# Patient Record
Sex: MALE | Race: WHITE | NOT HISPANIC OR LATINO | Employment: PART TIME | URBAN - METROPOLITAN AREA
[De-identification: names, ages, dates, MRNs, and addresses within clinical notes are randomized per-mention and may not be internally consistent; named-entity substitution may affect disease eponyms.]

---
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
## 2022-01-31 ENCOUNTER — OFFICE VISIT (OUTPATIENT)
Dept: PEDIATRICS | Facility: CLINIC | Age: 22
End: 2022-01-31
Payer: COMMERCIAL

## 2022-01-31 VITALS
BODY MASS INDEX: 33 KG/M2 | WEIGHT: 206 LBS | HEART RATE: 76 BPM | TEMPERATURE: 98 F | DIASTOLIC BLOOD PRESSURE: 79 MMHG | SYSTOLIC BLOOD PRESSURE: 126 MMHG

## 2022-01-31 DIAGNOSIS — F90.2 ADHD (ATTENTION DEFICIT HYPERACTIVITY DISORDER), COMBINED TYPE: Primary | ICD-10-CM

## 2022-01-31 DIAGNOSIS — F41.9 ANXIETY: ICD-10-CM

## 2022-01-31 PROCEDURE — 1160F RVW MEDS BY RX/DR IN RCRD: CPT | Mod: CPTII,S$GLB,, | Performed by: PEDIATRICS

## 2022-01-31 PROCEDURE — 3074F PR MOST RECENT SYSTOLIC BLOOD PRESSURE < 130 MM HG: ICD-10-PCS | Mod: CPTII,S$GLB,, | Performed by: PEDIATRICS

## 2022-01-31 PROCEDURE — 1159F MED LIST DOCD IN RCRD: CPT | Mod: CPTII,S$GLB,, | Performed by: PEDIATRICS

## 2022-01-31 PROCEDURE — 3078F PR MOST RECENT DIASTOLIC BLOOD PRESSURE < 80 MM HG: ICD-10-PCS | Mod: CPTII,S$GLB,, | Performed by: PEDIATRICS

## 2022-01-31 PROCEDURE — 99214 OFFICE O/P EST MOD 30 MIN: CPT | Mod: S$GLB,,, | Performed by: PEDIATRICS

## 2022-01-31 PROCEDURE — 3008F PR BODY MASS INDEX (BMI) DOCUMENTED: ICD-10-PCS | Mod: CPTII,S$GLB,, | Performed by: PEDIATRICS

## 2022-01-31 PROCEDURE — 1160F PR REVIEW ALL MEDS BY PRESCRIBER/CLIN PHARMACIST DOCUMENTED: ICD-10-PCS | Mod: CPTII,S$GLB,, | Performed by: PEDIATRICS

## 2022-01-31 PROCEDURE — 99214 PR OFFICE/OUTPT VISIT, EST, LEVL IV, 30-39 MIN: ICD-10-PCS | Mod: S$GLB,,, | Performed by: PEDIATRICS

## 2022-01-31 PROCEDURE — 3008F BODY MASS INDEX DOCD: CPT | Mod: CPTII,S$GLB,, | Performed by: PEDIATRICS

## 2022-01-31 PROCEDURE — 3074F SYST BP LT 130 MM HG: CPT | Mod: CPTII,S$GLB,, | Performed by: PEDIATRICS

## 2022-01-31 PROCEDURE — 1159F PR MEDICATION LIST DOCUMENTED IN MEDICAL RECORD: ICD-10-PCS | Mod: CPTII,S$GLB,, | Performed by: PEDIATRICS

## 2022-01-31 PROCEDURE — 99999 PR PBB SHADOW E&M-EST. PATIENT-LVL IV: CPT | Mod: PBBFAC,,, | Performed by: PEDIATRICS

## 2022-01-31 PROCEDURE — 3078F DIAST BP <80 MM HG: CPT | Mod: CPTII,S$GLB,, | Performed by: PEDIATRICS

## 2022-01-31 PROCEDURE — 99999 PR PBB SHADOW E&M-EST. PATIENT-LVL IV: ICD-10-PCS | Mod: PBBFAC,,, | Performed by: PEDIATRICS

## 2022-01-31 RX ORDER — FLUOXETINE HYDROCHLORIDE 40 MG/1
40 CAPSULE ORAL DAILY
Qty: 90 CAPSULE | Refills: 0 | Status: SHIPPED | OUTPATIENT
Start: 2022-01-31 | End: 2022-05-01

## 2022-01-31 RX ORDER — DEXMETHYLPHENIDATE HYDROCHLORIDE 40 MG/1
40 CAPSULE, EXTENDED RELEASE ORAL EVERY MORNING
Qty: 30 CAPSULE | Refills: 0 | Status: SHIPPED | OUTPATIENT
Start: 2022-01-31 | End: 2022-05-11 | Stop reason: SDUPTHER

## 2022-01-31 NOTE — PROGRESS NOTES
Subjective:   HPI:  Agustin Minor is a 21 y.o. child here for follow up ADHD visit, accompanied by self      his  ADHD symtoms have increased since last visit. Med wears off early in four to five hours      Attentive with homework or afternoon: yes    Side effects of Medicine:    Trouble Sleeping-no    Appetite Changes- decreased   Jitteriness- no  Irritability or moodiness- no  Headaches or Stomach Aches- no     Level/Grade in School:[employer] yr, chemical eng & bio chem, minor in arabic (aceing it)  Performance: GPA- 3.0  Now 15 hours, was 21 hours.    Current ADHD Medication/Dose in am: Focalin XR 30mg    Afternoon medicine?   Dose- none    Last RHS:  8/6/21    Review of patient's allergies indicates:   Allergen Reactions    Sulfa (sulfonamide antibiotics)        Patient Active Problem List   Diagnosis    Vasovagal syncope    ADHD (attention deficit hyperactivity disorder), combined type    Anxiety       Review of Systems     No flowsheet data found.      Objective:   PHYSICAL EXAM  Vitals:    01/31/22 1037   BP: 126/79   Pulse: 76   Temp: 97.8 °F (36.6 °C)   Weight: 93.4 kg (206 lb)       Weight change since last visit- [unfilled]   Last 3 Weights:   Wt Readings from Last 3 Encounters:   01/31/22 1037 93.4 kg (206 lb) (94 %, Z= 1.55)*   11/10/21 1107 96.6 kg (213 lb) (96 %, Z= 1.73)*   06/30/21 0820 95.3 kg (210 lb) (95 %, Z= 1.69)*     * Growth percentiles are based on CDC (Boys, 2-20 Years) data.           General:  Patient is alert and calm/cooperative. No agitation was noted.  ENT: Both tympanic membranes appeared normal. Nasopharynx was clear. Oropharynx had no lesions or redness in the throat. No dental caries were present.  Eyes: No eye twitching or tics. No injection or eye discharge. Pupils were equal, round, and reactive.  Resp: No wheezing or crackles.  Good air flow throughout.  Cardiovascular: Regular rate and rhythm -- no murmurs present.  Abd/GI: No tenderness, hepatomegaly,  splenomegaly, or masses.  Neuro: No tics or tremors noted.  Behavior/Psych: No signs of irritability or confusion.      Assessment/Plan:        ADHD (attention deficit hyperactivity disorder), combined type  -     dexmethylphenidate (FOCALIN XR) 40 mg 24 hr capsule; Take 40 mg by mouth every morning.  Dispense: 30 capsule; Refill: 0    Anxiety  -     FLUoxetine 40 MG capsule; Take 1 capsule (40 mg total) by mouth once daily.  Dispense: 90 capsule; Refill: 0            Outpatient Encounter Medications as of 1/31/2022   Medication Sig Dispense Refill    dexmethylphenidate (FOCALIN XR) 30 mg 24 hr capsule Take 1 capsule by mouth once daily. 30 capsule 0    levocetirizine (XYZAL) 5 MG tablet Take 5 mg by mouth.      mometasone (ASMANEX TWISTHALER) 220 mcg/ actuation (120) AePB Inhale into the lungs. Controller      albuterol (PROVENTIL/VENTOLIN HFA) 90 mcg/actuation inhaler       ALPRAZolam (XANAX) 0.5 MG tablet Take 1 tablet (0.5 mg total) by mouth as needed for Anxiety. Take 0.25 one hour before appointment 3 tablet 0    dexmethylphenidate (FOCALIN XR) 40 mg 24 hr capsule Take 40 mg by mouth every morning. 30 capsule 0    dextroamphetamine-amphetamine (ADDERALL XR) 30 MG 24 hr capsule Take 1 capsule (30 mg total) by mouth every morning. (Patient not taking: Reported on 1/31/2022) 30 capsule 0    FLUoxetine 40 MG capsule Take 40 mg by mouth once daily.      FLUoxetine 40 MG capsule Take 1 capsule (40 mg total) by mouth once daily. (Patient not taking: Reported on 1/31/2022) 90 capsule 0    FLUoxetine 40 MG capsule Take 1 capsule (40 mg total) by mouth once daily. 90 capsule 0    hydrocortisone 2.5 % ointment APPLY EXTERNALLY TO THE AFFECTED AREA ON NECK TWICE DAILY AS NEEDED      SYMBICORT 160-4.5 mcg/actuation HFAA       triamcinolone acetonide 0.1% (KENALOG) 0.1 % paste APPLY TO ULCER THREE TIMES DAILY AS NEEDED       No facility-administered encounter medications on file as of 1/31/2022.         Next  scheduled follow-up appointment for ADD/ADHD management will be in 3 months.  If changes are made to medications, then will need to follow up in three to four weeks.    We discussed the management goals for patients with ADHD:  1. Adequate Control of Focus and/or Behavior.  2. Tolerable Medication Side-Effects (Including some Loss of Appetite).  Need to maintain weight.  3. Function well in life, school and/or work.

## 2022-03-01 ENCOUNTER — OFFICE VISIT (OUTPATIENT)
Dept: PEDIATRICS | Facility: CLINIC | Age: 22
End: 2022-03-01
Payer: COMMERCIAL

## 2022-03-01 VITALS
DIASTOLIC BLOOD PRESSURE: 80 MMHG | HEART RATE: 67 BPM | BODY MASS INDEX: 33.48 KG/M2 | TEMPERATURE: 98 F | SYSTOLIC BLOOD PRESSURE: 133 MMHG | WEIGHT: 209 LBS

## 2022-03-01 DIAGNOSIS — F90.2 ADHD (ATTENTION DEFICIT HYPERACTIVITY DISORDER), COMBINED TYPE: Primary | ICD-10-CM

## 2022-03-01 DIAGNOSIS — F41.9 ANXIETY: ICD-10-CM

## 2022-03-01 PROCEDURE — 3079F PR MOST RECENT DIASTOLIC BLOOD PRESSURE 80-89 MM HG: ICD-10-PCS | Mod: CPTII,S$GLB,, | Performed by: PEDIATRICS

## 2022-03-01 PROCEDURE — 1159F PR MEDICATION LIST DOCUMENTED IN MEDICAL RECORD: ICD-10-PCS | Mod: CPTII,S$GLB,, | Performed by: PEDIATRICS

## 2022-03-01 PROCEDURE — 3075F SYST BP GE 130 - 139MM HG: CPT | Mod: CPTII,S$GLB,, | Performed by: PEDIATRICS

## 2022-03-01 PROCEDURE — 99214 OFFICE O/P EST MOD 30 MIN: CPT | Mod: S$GLB,,, | Performed by: PEDIATRICS

## 2022-03-01 PROCEDURE — 99214 PR OFFICE/OUTPT VISIT, EST, LEVL IV, 30-39 MIN: ICD-10-PCS | Mod: S$GLB,,, | Performed by: PEDIATRICS

## 2022-03-01 PROCEDURE — 99999 PR PBB SHADOW E&M-EST. PATIENT-LVL III: ICD-10-PCS | Mod: PBBFAC,,, | Performed by: PEDIATRICS

## 2022-03-01 PROCEDURE — 1160F PR REVIEW ALL MEDS BY PRESCRIBER/CLIN PHARMACIST DOCUMENTED: ICD-10-PCS | Mod: CPTII,S$GLB,, | Performed by: PEDIATRICS

## 2022-03-01 PROCEDURE — 1159F MED LIST DOCD IN RCRD: CPT | Mod: CPTII,S$GLB,, | Performed by: PEDIATRICS

## 2022-03-01 PROCEDURE — 99999 PR PBB SHADOW E&M-EST. PATIENT-LVL III: CPT | Mod: PBBFAC,,, | Performed by: PEDIATRICS

## 2022-03-01 PROCEDURE — 3008F BODY MASS INDEX DOCD: CPT | Mod: CPTII,S$GLB,, | Performed by: PEDIATRICS

## 2022-03-01 PROCEDURE — 3079F DIAST BP 80-89 MM HG: CPT | Mod: CPTII,S$GLB,, | Performed by: PEDIATRICS

## 2022-03-01 PROCEDURE — 1160F RVW MEDS BY RX/DR IN RCRD: CPT | Mod: CPTII,S$GLB,, | Performed by: PEDIATRICS

## 2022-03-01 PROCEDURE — 3008F PR BODY MASS INDEX (BMI) DOCUMENTED: ICD-10-PCS | Mod: CPTII,S$GLB,, | Performed by: PEDIATRICS

## 2022-03-01 PROCEDURE — 3075F PR MOST RECENT SYSTOLIC BLOOD PRESS GE 130-139MM HG: ICD-10-PCS | Mod: CPTII,S$GLB,, | Performed by: PEDIATRICS

## 2022-03-01 RX ORDER — DEXMETHYLPHENIDATE HYDROCHLORIDE 40 MG/1
40 CAPSULE, EXTENDED RELEASE ORAL EVERY MORNING
Qty: 30 CAPSULE | Refills: 0 | Status: SHIPPED | OUTPATIENT
Start: 2022-03-31 | End: 2022-04-30

## 2022-03-01 RX ORDER — DEXMETHYLPHENIDATE HYDROCHLORIDE 40 MG/1
40 CAPSULE, EXTENDED RELEASE ORAL EVERY MORNING
Qty: 30 CAPSULE | Refills: 0 | Status: SHIPPED | OUTPATIENT
Start: 2022-04-30 | End: 2022-05-30

## 2022-03-01 RX ORDER — FLUOXETINE HYDROCHLORIDE 40 MG/1
40 CAPSULE ORAL DAILY
Qty: 90 CAPSULE | Refills: 0 | Status: SHIPPED | OUTPATIENT
Start: 2022-03-01 | End: 2022-05-30

## 2022-03-01 RX ORDER — DEXMETHYLPHENIDATE HYDROCHLORIDE 40 MG/1
40 CAPSULE, EXTENDED RELEASE ORAL EVERY MORNING
Qty: 30 CAPSULE | Refills: 0 | Status: SHIPPED | OUTPATIENT
Start: 2022-03-01 | End: 2022-03-31

## 2022-03-01 NOTE — PROGRESS NOTES
Subjective:   HPI:  Agustin Minor is a 21 y.o. child here for follow up ADHD visit,  alone, who is a historian    his  ADHD symtoms have no change since last visit.    Attentive with homework or afternoon: yes    Side effects of Medicine:    Trouble Sleeping-no    Appetite Changes- no   Jitteriness- no  Irritability or moodiness- no  Headaches or Stomach Aches- no     Level/Grade in School:[employer]/ sophomore yr, chemical eng. , bio chem, minor in [place]  Performance: GPA 3.0    Current ADHD Medication/Dose in am: Focalin XR 40mg   Afternoon medicine?   Dose- none    Anxiety- controlled  On Prozac 40 mg. Daily    Last RHS:  08/06/21    Review of patient's allergies indicates:   Allergen Reactions    Sulfa (sulfonamide antibiotics)        Patient Active Problem List   Diagnosis    Vasovagal syncope    ADHD (attention deficit hyperactivity disorder), combined type    Anxiety       Review of Systems     No flowsheet data found.      Objective:   PHYSICAL EXAM  Vitals:    03/01/22 1036   BP: 133/80   Pulse: 67   Temp: 98 °F (36.7 °C)   Weight: 94.8 kg (209 lb)       Weight change since last visit- [unfilled]   Last 3 Weights:   Wt Readings from Last 3 Encounters:   03/01/22 1036 94.8 kg (209 lb) (95 %, Z= 1.61)*   01/31/22 1037 93.4 kg (206 lb) (94 %, Z= 1.55)*   11/10/21 1107 96.6 kg (213 lb) (96 %, Z= 1.73)*     * Growth percentiles are based on CDC (Boys, 2-20 Years) data.           General:  Patient is alert and calm/cooperative. No agitation was noted.  ENT: Both tympanic membranes appeared normal. Nasopharynx was clear. Oropharynx had no lesions or redness in the throat. No dental caries were present.  Eyes: No eye twitching or tics. No injection or eye discharge. Pupils were equal, round, and reactive.  Resp: No wheezing or crackles.  Good air flow throughout.  Cardiovascular: Regular rate and rhythm -- no murmurs present.  Abd/GI: No tenderness, hepatomegaly, splenomegaly, or masses.  Neuro: No tics  or tremors noted.  Behavior/Psych: No signs of irritability or confusion.      Assessment/Plan:        ADHD (attention deficit hyperactivity disorder), combined type  -     dexmethylphenidate (FOCALIN XR) 40 mg 24 hr capsule; Take 40 mg by mouth every morning.  Dispense: 30 capsule; Refill: 0  -     dexmethylphenidate (FOCALIN XR) 40 mg 24 hr capsule; Take 40 mg by mouth every morning.  Dispense: 30 capsule; Refill: 0  -     dexmethylphenidate (FOCALIN XR) 40 mg 24 hr capsule; Take 40 mg by mouth every morning.  Dispense: 30 capsule; Refill: 0    Anxiety  -     FLUoxetine 40 MG capsule; Take 1 capsule (40 mg total) by mouth once daily.  Dispense: 90 capsule; Refill: 0            Outpatient Encounter Medications as of 3/1/2022   Medication Sig Dispense Refill    albuterol (PROVENTIL/VENTOLIN HFA) 90 mcg/actuation inhaler       dexmethylphenidate (FOCALIN XR) 40 mg 24 hr capsule Take 40 mg by mouth every morning. 30 capsule 0    FLUoxetine 40 MG capsule Take 1 capsule (40 mg total) by mouth once daily. 90 capsule 0    ALPRAZolam (XANAX) 0.5 MG tablet Take 1 tablet (0.5 mg total) by mouth as needed for Anxiety. Take 0.25 one hour before appointment 3 tablet 0    dexmethylphenidate (FOCALIN XR) 30 mg 24 hr capsule Take 1 capsule by mouth once daily. (Patient not taking: Reported on 3/1/2022) 30 capsule 0    dexmethylphenidate (FOCALIN XR) 40 mg 24 hr capsule Take 40 mg by mouth every morning. 30 capsule 0    [START ON 3/31/2022] dexmethylphenidate (FOCALIN XR) 40 mg 24 hr capsule Take 40 mg by mouth every morning. 30 capsule 0    [START ON 4/30/2022] dexmethylphenidate (FOCALIN XR) 40 mg 24 hr capsule Take 40 mg by mouth every morning. 30 capsule 0    dextroamphetamine-amphetamine (ADDERALL XR) 30 MG 24 hr capsule Take 1 capsule (30 mg total) by mouth every morning. (Patient not taking: Reported on 1/31/2022) 30 capsule 0    FLUoxetine 40 MG capsule Take 40 mg by mouth once daily.      FLUoxetine 40 MG  capsule Take 1 capsule (40 mg total) by mouth once daily. 90 capsule 0    hydrocortisone 2.5 % ointment APPLY EXTERNALLY TO THE AFFECTED AREA ON NECK TWICE DAILY AS NEEDED      levocetirizine (XYZAL) 5 MG tablet Take 5 mg by mouth.      mometasone (ASMANEX TWISTHALER) 220 mcg/ actuation (120) AePB Inhale into the lungs. Controller      SYMBICORT 160-4.5 mcg/actuation HFAA       triamcinolone acetonide 0.1% (KENALOG) 0.1 % paste APPLY TO ULCER THREE TIMES DAILY AS NEEDED       No facility-administered encounter medications on file as of 3/1/2022.         Next scheduled follow-up appointment for ADD/ADHD management will be in 3 months.  If changes are made to medications, then will need to follow up in three to four weeks.    We discussed the management goals for patients with ADHD:  1. Adequate Control of Focus and/or Behavior.  2. Tolerable Medication Side-Effects (Including some Loss of Appetite).  Need to maintain weight.  3. Function well in life, school and/or work.

## 2022-03-29 ENCOUNTER — OFFICE VISIT (OUTPATIENT)
Dept: PEDIATRICS | Facility: CLINIC | Age: 22
End: 2022-03-29
Payer: COMMERCIAL

## 2022-03-29 VITALS — TEMPERATURE: 98 F | WEIGHT: 202 LBS | BODY MASS INDEX: 32.36 KG/M2

## 2022-03-29 DIAGNOSIS — K21.9 GASTROESOPHAGEAL REFLUX DISEASE, UNSPECIFIED WHETHER ESOPHAGITIS PRESENT: Primary | ICD-10-CM

## 2022-03-29 DIAGNOSIS — K52.9 GASTROENTERITIS: ICD-10-CM

## 2022-03-29 PROBLEM — J45.909 ASTHMA: Status: ACTIVE | Noted: 2022-03-29

## 2022-03-29 PROCEDURE — 99214 OFFICE O/P EST MOD 30 MIN: CPT | Mod: S$GLB,,, | Performed by: PEDIATRICS

## 2022-03-29 PROCEDURE — 1159F PR MEDICATION LIST DOCUMENTED IN MEDICAL RECORD: ICD-10-PCS | Mod: CPTII,S$GLB,, | Performed by: PEDIATRICS

## 2022-03-29 PROCEDURE — 99999 PR PBB SHADOW E&M-EST. PATIENT-LVL IV: CPT | Mod: PBBFAC,,, | Performed by: PEDIATRICS

## 2022-03-29 PROCEDURE — 99214 PR OFFICE/OUTPT VISIT, EST, LEVL IV, 30-39 MIN: ICD-10-PCS | Mod: S$GLB,,, | Performed by: PEDIATRICS

## 2022-03-29 PROCEDURE — 1159F MED LIST DOCD IN RCRD: CPT | Mod: CPTII,S$GLB,, | Performed by: PEDIATRICS

## 2022-03-29 PROCEDURE — 99999 PR PBB SHADOW E&M-EST. PATIENT-LVL IV: ICD-10-PCS | Mod: PBBFAC,,, | Performed by: PEDIATRICS

## 2022-03-29 PROCEDURE — 1160F PR REVIEW ALL MEDS BY PRESCRIBER/CLIN PHARMACIST DOCUMENTED: ICD-10-PCS | Mod: CPTII,S$GLB,, | Performed by: PEDIATRICS

## 2022-03-29 PROCEDURE — 3008F PR BODY MASS INDEX (BMI) DOCUMENTED: ICD-10-PCS | Mod: CPTII,S$GLB,, | Performed by: PEDIATRICS

## 2022-03-29 PROCEDURE — 1160F RVW MEDS BY RX/DR IN RCRD: CPT | Mod: CPTII,S$GLB,, | Performed by: PEDIATRICS

## 2022-03-29 PROCEDURE — 3008F BODY MASS INDEX DOCD: CPT | Mod: CPTII,S$GLB,, | Performed by: PEDIATRICS

## 2022-03-29 RX ORDER — ESOMEPRAZOLE MAGNESIUM 40 MG/1
40 CAPSULE, DELAYED RELEASE ORAL DAILY
Qty: 30 CAPSULE | Refills: 1 | Status: SHIPPED | OUTPATIENT
Start: 2022-03-29 | End: 2023-03-29

## 2022-03-29 NOTE — PROGRESS NOTES
SUBJECTIVE:  Agustin Minor is a 21 y.o. 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    HPI  X1 day, abdominal pain that started in lower abdomen and moved to mid abdomen, vomited few times, no fever/  Located above umbilicus    Agustin's allergies, medications, history, and problem list were updated as appropriate.    Review of Systems  A comprehensive review of symptoms was completed and negative except as noted in the HPI.    OBJECTIVE:  Vital signs  Vitals:    03/29/22 1434   Temp: 97.7 °F (36.5 °C)   Weight: 91.6 kg (202 lb)        Physical Exam  Vitals reviewed.   Constitutional:       Appearance: Normal appearance.   HENT:      Right Ear: Tympanic membrane normal.      Left Ear: Tympanic membrane normal.      Nose: Nose normal.      Mouth/Throat:      Pharynx: Oropharynx is clear.   Eyes:      Conjunctiva/sclera: Conjunctivae normal.   Cardiovascular:      Rate and Rhythm: Normal rate and regular rhythm.      Heart sounds: Normal heart sounds. No murmur heard.    No friction rub. No gallop.   Pulmonary:      Breath sounds: Normal breath sounds.   Abdominal:      General: Abdomen is flat. There is no distension.      Palpations: Abdomen is soft. There is no mass.      Tenderness: There is no abdominal tenderness. There is no guarding or rebound.      Hernia: No hernia is present.   Musculoskeletal:         General: Normal range of motion.      Cervical back: Neck supple.   Skin:     Findings: No rash.   Neurological:      General: No focal deficit present.            ASSESSMENT/PLAN:  Agustin was seen today for vomiting and abdominal pain.    Diagnoses and all orders for this visit:    Gastroesophageal reflux disease, unspecified whether esophagitis present  -     esomeprazole (NEXIUM) 40 MG capsule; Take 1 capsule (40 mg total) by mouth once daily.    Gastroenteritis         No visits with results within 1 Day(s) from this visit.   Latest known visit with results is:   Office Visit on 04/30/2021   Component Date Value  Ref Range Status    WBC 05/13/2021 5.4  4.0 - 11.0 1000/uL Final    RBC 05/13/2021 5.03  4.50 - 5.60 mill/uL Final    Hemoglobin 05/13/2021 14.5  14.0 - 18.0 g/dL Final    Hematocrit 05/13/2021 44.4  42.0 - 52.0 % Final    MCV 05/13/2021 88  80 - 100 fL Final    MCHC 05/13/2021 32.6  31.0 - 37.0 g/dL Final    RDW 05/13/2021 11.8 (A) 12.1 - 14.9 % Final    Platelets 05/13/2021 229  150 - 375 K/uL Final    MEAN PLATELET VOLUME (MPV)   7.9              fL         6.5-12.0   N    Neutrophils 05/13/2021 38 (A) 44 - 81 % Final    Lymphs 05/13/2021 44  21 - 47 % Final    Monocytes 05/13/2021 8  2 - 11 % Final    Eos 05/13/2021 9 (A) 0 - 7 % Final    Basos 05/13/2021 1  0 - 1 % Final    Neutrophils (Absolute) 05/13/2021 2.1  1.5 - 10.0 1000/UL Final    Lymphs (Absolute) 05/13/2021 2.4  1.3 - 2.9 1000/ul Final    Monocytes(Absolute) 05/13/2021 0.4  0.1 - 1.0 1000/ul Final    Eos (Absolute) 05/13/2021 0.5  0.0 - 0.7 1000/UL Final    Baso (Absolute) 05/13/2021 0.1  0.0 - 0.1 1000/UL Final    Immunoglobulin E 05/13/2021 789 (A) 6 - 495 IU/mL Final    Glucose 05/13/2021 102 (A) 70 - 100 mg/dL Final    BUN 05/13/2021 17  5 - 25 mg/dL Final    Creatinine 05/13/2021 0.74  0.57 - 1.25 mg/dL Final    eGFR if non African American 05/13/2021 136  >=60 Final    eGFR if  05/13/2021 165  >=60 Final    Comment:   MDRD Calculated GFR estimate resulted by System based on  Creatinine Level.  Adjusted for gender and age.  Results  calculated in ml/min/1.73mSquared.  Reference Range: >= 60  ml/min/1.73mSquared.      Sodium 05/13/2021 139  136 - 145 mmol/L Final    Potassium 05/13/2021 4.6  3.5 - 5.1 mmol/L Final    Chloride 05/13/2021 105  100 - 109 mmol/L Final    CO2 05/13/2021 24  22 - 33 mmol/L Final    ANION GAP (ANIONGAP)         10               mmol/L     8-16       N    Calcium 05/13/2021 9.3  8.8 - 10.6 mg/dL Final    Protein, Total 05/13/2021 6.9  6.0 - 8.3 g/dL Final    Albumin  05/13/2021 4.2  3.5 - 5.0 g/dl Final    Total Bilirubin 05/13/2021 0.7  0.2 - 1.2 mg/dL Final    Alkaline Phosphatase 05/13/2021 160 (A) 40 - 150 U/L Final    AST 05/13/2021 19  10 - 58 U/L Final    ALT 05/13/2021 22  5 - 50 U/L Final    INR 05/13/2021 1.0   Final    Prothrombin Time 05/13/2021 13.3  11.5 - 15.3 SECONDS Final    aPTT 05/13/2021 30  21 - 35 SECONDS Final     HO- GE    Handout provided  Follow instructions listed on hand out for treatment  Call or return to clinic if worsens or does not resolve      Add Nexium x 2 weeks  [place] Diet  TUMS prn  Probiotic    Follow Up:  No follow-ups on file.

## 2022-03-29 NOTE — PATIENT INSTRUCTIONS
Dr. Lewis, [facility]  [facility]olescent [facility]  [phone]        VOMITING and DIARRHEA    What is vomiting and diarrhea?:   - Vomiting is forceful ejection of stomach contents through the mouth  - Diarrhea is sudden increased frequency or looseness of stools    Cause:  - Most commonly caused by viral infection, called gastroenteritis, diarrhea associated frequently    How long does it last?  - vomiting- a few days  - diarrhea- up to a week     Dehydration?  - No urination for long periods  - Crying with no tears, mouth dry  - Dizziness with standing  - Listlessness    Treatment- Dietary (not medicines):        CHILDREN+:  1. Nothing to eat or drink for 3 - 4 hours  - give your child's belly a chance to rest  2.  Clear liquids (light colored Gatorade, Water, defizzed Sprite)  - start small amounts, frequently- start small amounts.  Advance as tolerated in frequency and amount  3.  Stokes- bananas, rice, toast, mashed potatoes, crackers    REMEMBER:  - You need to give the belly a chance to rest;  Feeding too quickly after vomiting will result in vomiting, again    Contagiousness:  - Can return to /school after vomiting has resolved and diarrhea is controllable    Probiotics:  Probiotics, such as Culturelle or VSL3, can be given to aid gut healing after the acute phase    Call Immediately if:  - Your infant has not urinated in 12 hours  - Signs of dehydration develop  - Significant abdominal pain, sofya. RLQ  - Your child starts acting very sick     Call during regular office hours if:  - Blood or mucus appears in the diarrhea  - Diarrhea persists longer than a week  - Persistent vomiting  - Persistent fever  - Your child is getting worse  - You have any concerns or questions         TUMS as needed    Nexium 40 mg x 2 weeks

## 2022-04-11 ENCOUNTER — TELEPHONE (OUTPATIENT)
Dept: PEDIATRICS | Facility: CLINIC | Age: 22
End: 2022-04-11
Payer: COMMERCIAL

## 2022-05-11 ENCOUNTER — OFFICE VISIT (OUTPATIENT)
Dept: PEDIATRICS | Facility: CLINIC | Age: 22
End: 2022-05-11
Payer: COMMERCIAL

## 2022-05-11 VITALS
BODY MASS INDEX: 32.84 KG/M2 | WEIGHT: 205 LBS | HEART RATE: 68 BPM | DIASTOLIC BLOOD PRESSURE: 74 MMHG | SYSTOLIC BLOOD PRESSURE: 120 MMHG

## 2022-05-11 DIAGNOSIS — L60.0 INGROWN TOENAIL: ICD-10-CM

## 2022-05-11 DIAGNOSIS — F90.2 ADHD (ATTENTION DEFICIT HYPERACTIVITY DISORDER), COMBINED TYPE: Primary | ICD-10-CM

## 2022-05-11 DIAGNOSIS — F90.2 ADHD (ATTENTION DEFICIT HYPERACTIVITY DISORDER), COMBINED TYPE: ICD-10-CM

## 2022-05-11 DIAGNOSIS — L03.818 CELLULITIS OF OTHER SPECIFIED SITE: ICD-10-CM

## 2022-05-11 PROCEDURE — 3074F SYST BP LT 130 MM HG: CPT | Mod: CPTII,S$GLB,, | Performed by: PEDIATRICS

## 2022-05-11 PROCEDURE — 99999 PR PBB SHADOW E&M-EST. PATIENT-LVL III: ICD-10-PCS | Mod: PBBFAC,,, | Performed by: PEDIATRICS

## 2022-05-11 PROCEDURE — 1159F MED LIST DOCD IN RCRD: CPT | Mod: CPTII,S$GLB,, | Performed by: PEDIATRICS

## 2022-05-11 PROCEDURE — 1160F RVW MEDS BY RX/DR IN RCRD: CPT | Mod: CPTII,S$GLB,, | Performed by: PEDIATRICS

## 2022-05-11 PROCEDURE — 1160F PR REVIEW ALL MEDS BY PRESCRIBER/CLIN PHARMACIST DOCUMENTED: ICD-10-PCS | Mod: CPTII,S$GLB,, | Performed by: PEDIATRICS

## 2022-05-11 PROCEDURE — 99214 OFFICE O/P EST MOD 30 MIN: CPT | Mod: S$GLB,,, | Performed by: PEDIATRICS

## 2022-05-11 PROCEDURE — 3074F PR MOST RECENT SYSTOLIC BLOOD PRESSURE < 130 MM HG: ICD-10-PCS | Mod: CPTII,S$GLB,, | Performed by: PEDIATRICS

## 2022-05-11 PROCEDURE — 3078F DIAST BP <80 MM HG: CPT | Mod: CPTII,S$GLB,, | Performed by: PEDIATRICS

## 2022-05-11 PROCEDURE — 99999 PR PBB SHADOW E&M-EST. PATIENT-LVL III: CPT | Mod: PBBFAC,,, | Performed by: PEDIATRICS

## 2022-05-11 PROCEDURE — 1159F PR MEDICATION LIST DOCUMENTED IN MEDICAL RECORD: ICD-10-PCS | Mod: CPTII,S$GLB,, | Performed by: PEDIATRICS

## 2022-05-11 PROCEDURE — 3078F PR MOST RECENT DIASTOLIC BLOOD PRESSURE < 80 MM HG: ICD-10-PCS | Mod: CPTII,S$GLB,, | Performed by: PEDIATRICS

## 2022-05-11 PROCEDURE — 3008F BODY MASS INDEX DOCD: CPT | Mod: CPTII,S$GLB,, | Performed by: PEDIATRICS

## 2022-05-11 PROCEDURE — 3008F PR BODY MASS INDEX (BMI) DOCUMENTED: ICD-10-PCS | Mod: CPTII,S$GLB,, | Performed by: PEDIATRICS

## 2022-05-11 PROCEDURE — 99214 PR OFFICE/OUTPT VISIT, EST, LEVL IV, 30-39 MIN: ICD-10-PCS | Mod: S$GLB,,, | Performed by: PEDIATRICS

## 2022-05-11 RX ORDER — DEXMETHYLPHENIDATE HYDROCHLORIDE 40 MG/1
40 CAPSULE, EXTENDED RELEASE ORAL EVERY MORNING
Qty: 30 CAPSULE | Refills: 0 | Status: SHIPPED | OUTPATIENT
Start: 2022-05-11 | End: 2022-06-10

## 2022-05-11 RX ORDER — DEXMETHYLPHENIDATE HYDROCHLORIDE 40 MG/1
40 CAPSULE, EXTENDED RELEASE ORAL EVERY MORNING
Qty: 30 CAPSULE | Refills: 0 | Status: SHIPPED | OUTPATIENT
Start: 2022-06-10 | End: 2022-07-10

## 2022-05-11 RX ORDER — MUPIROCIN 20 MG/G
OINTMENT TOPICAL 3 TIMES DAILY
Qty: 15 G | Refills: 3 | Status: SHIPPED | OUTPATIENT
Start: 2022-05-11 | End: 2022-05-18

## 2022-05-11 RX ORDER — DEXMETHYLPHENIDATE HYDROCHLORIDE 40 MG/1
40 CAPSULE, EXTENDED RELEASE ORAL EVERY MORNING
Qty: 30 CAPSULE | Refills: 0 | Status: SHIPPED | OUTPATIENT
Start: 2022-07-10 | End: 2022-08-09

## 2022-05-11 RX ORDER — AMOXICILLIN AND CLAVULANATE POTASSIUM 875; 125 MG/1; MG/1
1 TABLET, FILM COATED ORAL 2 TIMES DAILY
Qty: 20 TABLET | Refills: 0 | Status: SHIPPED | OUTPATIENT
Start: 2022-05-11 | End: 2022-05-21

## 2022-05-11 NOTE — PATIENT INSTRUCTIONS
[facility] Escobar Perill[facility], [facility]  [facility]  [phone]        INGROWN TOENAIL or ONCHOCRYPTOSIS        What is an ingrown toenail?:  - Tenderness, redness and swelling of skin around the corner of the great toe nail because of inflammation and/or infection.    Cause:  -  Due to tight shoes, repeated trauma or improper trimming of the toenails.  The nail grows into the skin of the toe.    Treatment:  - [place].  As often as possible, minimum twice a day for 20 minutes in warm water with Epsom salts.  2.  May add a little liquid soap to help penetration.  3.  While foot is wet, push swollen part of cuticle outward, bending the corners of the nail upward.    - Antibiotic:  - Sometimes, Bactroban or Neosporin ointment may be applied if cuticle is swollen or oozing to help with infection.  - Sometimes, oral antibiotic- Augmentin or Duricef may be given for infection.    - Cutting off the corner of the toenail:  -  To help with pain, the edge of the toenail can be cut off, so the irritated tissue may heal.  ONLY do this once.  You do not want the nail to keep growing back into the tissue rather than over it.     Protection during healing:  - Wear sandals or go barefoot to prevent pressure on the toenail during healing.  - Padding the areas of the toe that are inflamed with cotton or gauze to cushion it.    Preventing Recurrences:  1.  Shoes should not be too narrow, avoiding tight/pointed shoes  2.  Cut the toenails straight across, not rounding the corners.  Do not cut them too short.             Chronic Ingrown Toenails:  - If the condition does not respond to treatment and becomes chronic, sometimes a sliver of nail needs to be surgically removed and the nail bed ablated by a podiatrist or dermatologist.      Call Immediately if:  - Red streak spreads beyond the toe  - Fever develops    Call during regular office hours if:  - Ingrown toenail develops a yellow discharge  - It is  not better in one week or not totally resolved in two weeks  - You have any concerns or questions

## 2022-05-11 NOTE — PROGRESS NOTES
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    HPI  Possible infection on right big toe, has been 2-3 months     Agustin's allergies, medications, history, and problem list were updated as appropriate.    Review of Systems  A comprehensive review of symptoms was completed and negative except as noted in the HPI.        Subjective:   HPI:    his  ADHD symtoms have no change since last visit.    Attentive with homework: yes    Side effects of Medicine:  Sleep-no  Appetite- no    Current ADHD Medication/Dose: Focalin Xr 40mg        OBJECTIVE:  Vital signs  Vitals:    05/11/22 1137   BP: 120/74   Pulse: 68   Weight: 93 kg (205 lb)        Physical Exam  Vitals reviewed.   Constitutional:       Appearance: Normal appearance.   HENT:      Right Ear: Tympanic membrane normal.      Left Ear: Tympanic membrane normal.      Nose: Nose normal.      Mouth/Throat:      Pharynx: Oropharynx is clear.   Eyes:      Conjunctiva/sclera: Conjunctivae normal.   Cardiovascular:      Rate and Rhythm: Normal rate and regular rhythm.      Heart sounds: Normal heart sounds. No murmur heard.    No friction rub. No gallop.   Pulmonary:      Breath sounds: Normal breath sounds.   Abdominal:      Palpations: Abdomen is soft.      Tenderness: There is no abdominal tenderness.   Musculoskeletal:         General: Normal range of motion.      Cervical back: Neck supple.   Skin:     Findings: No rash.      Comments: Right medial, periungal great toe- erythema, swelling   Neurological:      General: No focal deficit present.            ASSESSMENT/PLAN:  Agustin was seen today for adhd and ingrown toenail.    Diagnoses and all orders for this visit:    ADHD (attention deficit hyperactivity disorder), combined type  -     dexmethylphenidate (FOCALIN XR) 40 mg 24 hr capsule; Take 40 mg by mouth every morning.  -     dexmethylphenidate (FOCALIN XR) 40 mg 24 hr capsule; Take 40 mg by mouth every morning.  -      dexmethylphenidate (FOCALIN XR) 40 mg 24 hr capsule; Take 40 mg by mouth every morning.    Ingrown toenail  -     amoxicillin-clavulanate 875-125mg (AUGMENTIN) 875-125 mg per tablet; Take 1 tablet by mouth 2 (two) times daily. for 10 days  -     mupirocin (BACTROBAN) 2 % ointment; Apply topically 3 (three) times daily. for 7 days    Cellulitis of other specified site  -     amoxicillin-clavulanate 875-125mg (AUGMENTIN) 875-125 mg per tablet; Take 1 tablet by mouth 2 (two) times daily. for 10 days  -     mupirocin (BACTROBAN) 2 % ointment; Apply topically 3 (three) times daily. for 7 days         No visits with results within 1 Day(s) from this visit.   Latest known visit with results is:   Office Visit on 04/30/2021   Component Date Value Ref Range Status    WBC 05/13/2021 5.4  4.0 - 11.0 1000/uL Final    RBC 05/13/2021 5.03  4.50 - 5.60 mill/uL Final    Hemoglobin 05/13/2021 14.5  14.0 - 18.0 g/dL Final    Hematocrit 05/13/2021 44.4  42.0 - 52.0 % Final    MCV 05/13/2021 88  80 - 100 fL Final    MCHC 05/13/2021 32.6  31.0 - 37.0 g/dL Final    RDW 05/13/2021 11.8 (A) 12.1 - 14.9 % Final    Platelets 05/13/2021 229  150 - 375 K/uL Final    MEAN PLATELET VOLUME (MPV)   7.9              fL         6.5-12.0   N    Neutrophils 05/13/2021 38 (A) 44 - 81 % Final    Lymphs 05/13/2021 44  21 - 47 % Final    Monocytes 05/13/2021 8  2 - 11 % Final    Eos 05/13/2021 9 (A) 0 - 7 % Final    Basos 05/13/2021 1  0 - 1 % Final    Neutrophils (Absolute) 05/13/2021 2.1  1.5 - 10.0 1000/UL Final    Lymphs (Absolute) 05/13/2021 2.4  1.3 - 2.9 1000/ul Final    Monocytes(Absolute) 05/13/2021 0.4  0.1 - 1.0 1000/ul Final    Eos (Absolute) 05/13/2021 0.5  0.0 - 0.7 1000/UL Final    Baso (Absolute) 05/13/2021 0.1  0.0 - 0.1 1000/UL Final    Immunoglobulin E 05/13/2021 789 (A) 6 - 495 IU/mL Final    Glucose 05/13/2021 102 (A) 70 - 100 mg/dL Final    BUN 05/13/2021 17  5 - 25 mg/dL Final    Creatinine 05/13/2021 0.74   0.57 - 1.25 mg/dL Final    eGFR if non African American 05/13/2021 136  >=60 Final    eGFR if  05/13/2021 165  >=60 Final    Comment:   MDRD Calculated GFR estimate resulted by System based on  Creatinine Level.  Adjusted for gender and age.  Results  calculated in ml/min/1.73mSquared.  Reference Range: >= 60  ml/min/1.73mSquared.      Sodium 05/13/2021 139  136 - 145 mmol/L Final    Potassium 05/13/2021 4.6  3.5 - 5.1 mmol/L Final    Chloride 05/13/2021 105  100 - 109 mmol/L Final    CO2 05/13/2021 24  22 - 33 mmol/L Final    ANION GAP (ANIONGAP)         10               mmol/L     8-16       N    Calcium 05/13/2021 9.3  8.8 - 10.6 mg/dL Final    Protein, Total 05/13/2021 6.9  6.0 - 8.3 g/dL Final    Albumin 05/13/2021 4.2  3.5 - 5.0 g/dl Final    Total Bilirubin 05/13/2021 0.7  0.2 - 1.2 mg/dL Final    Alkaline Phosphatase 05/13/2021 160 (A) 40 - 150 U/L Final    AST 05/13/2021 19  10 - 58 U/L Final    ALT 05/13/2021 22  5 - 50 U/L Final    INR 05/13/2021 1.0   Final    Prothrombin Time 05/13/2021 13.3  11.5 - 15.3 SECONDS Final    aPTT 05/13/2021 30  21 - 35 SECONDS Final     HO- Ingrown toe nail    Handout provided  Follow instructions listed on hand out for treatment  Call or return to clinic if worsens or does not resolve        Follow Up:  Follow up in about 3 months (around 8/11/2022) for ADHD, annual check up.

## 2022-05-11 NOTE — TELEPHONE ENCOUNTER
Seen today, REINALDO Sin/[facility]aco is out    ----- Message from Anabell Neumann sent at 5/11/2022  3:52 PM CDT -----  Wants to see if he can move Focalin Rx to   [facility] at:  [id number] [facility]    Agustin's phone number: [phone]

## 2022-06-01 ENCOUNTER — TELEPHONE (OUTPATIENT)
Dept: PEDIATRICS | Facility: CLINIC | Age: 22
End: 2022-06-01
Payer: COMMERCIAL

## 2022-06-10 ENCOUNTER — OFFICE VISIT (OUTPATIENT)
Dept: PEDIATRICS | Facility: CLINIC | Age: 22
End: 2022-06-10
Payer: COMMERCIAL

## 2022-06-10 VITALS
HEART RATE: 86 BPM | BODY MASS INDEX: 33.48 KG/M2 | DIASTOLIC BLOOD PRESSURE: 70 MMHG | SYSTOLIC BLOOD PRESSURE: 133 MMHG | TEMPERATURE: 97 F | WEIGHT: 209 LBS

## 2022-06-10 DIAGNOSIS — L60.0 INGROWN TOENAIL: Primary | ICD-10-CM

## 2022-06-10 PROCEDURE — 3078F PR MOST RECENT DIASTOLIC BLOOD PRESSURE < 80 MM HG: ICD-10-PCS | Mod: CPTII,S$GLB,, | Performed by: PEDIATRICS

## 2022-06-10 PROCEDURE — 99999 PR PBB SHADOW E&M-EST. PATIENT-LVL III: CPT | Mod: PBBFAC,,, | Performed by: PEDIATRICS

## 2022-06-10 PROCEDURE — 3075F PR MOST RECENT SYSTOLIC BLOOD PRESS GE 130-139MM HG: ICD-10-PCS | Mod: CPTII,S$GLB,, | Performed by: PEDIATRICS

## 2022-06-10 PROCEDURE — 99213 OFFICE O/P EST LOW 20 MIN: CPT | Mod: S$GLB,,, | Performed by: PEDIATRICS

## 2022-06-10 PROCEDURE — 1159F PR MEDICATION LIST DOCUMENTED IN MEDICAL RECORD: ICD-10-PCS | Mod: CPTII,S$GLB,, | Performed by: PEDIATRICS

## 2022-06-10 PROCEDURE — 3078F DIAST BP <80 MM HG: CPT | Mod: CPTII,S$GLB,, | Performed by: PEDIATRICS

## 2022-06-10 PROCEDURE — 99999 PR PBB SHADOW E&M-EST. PATIENT-LVL III: ICD-10-PCS | Mod: PBBFAC,,, | Performed by: PEDIATRICS

## 2022-06-10 PROCEDURE — 3008F BODY MASS INDEX DOCD: CPT | Mod: CPTII,S$GLB,, | Performed by: PEDIATRICS

## 2022-06-10 PROCEDURE — 99213 PR OFFICE/OUTPT VISIT, EST, LEVL III, 20-29 MIN: ICD-10-PCS | Mod: S$GLB,,, | Performed by: PEDIATRICS

## 2022-06-10 PROCEDURE — 1159F MED LIST DOCD IN RCRD: CPT | Mod: CPTII,S$GLB,, | Performed by: PEDIATRICS

## 2022-06-10 PROCEDURE — 3075F SYST BP GE 130 - 139MM HG: CPT | Mod: CPTII,S$GLB,, | Performed by: PEDIATRICS

## 2022-06-10 PROCEDURE — 3008F PR BODY MASS INDEX (BMI) DOCUMENTED: ICD-10-PCS | Mod: CPTII,S$GLB,, | Performed by: PEDIATRICS

## 2022-06-10 RX ORDER — CLINDAMYCIN HYDROCHLORIDE 300 MG/1
300 CAPSULE ORAL 3 TIMES DAILY
Qty: 63 CAPSULE | Refills: 0 | Status: SHIPPED | OUTPATIENT
Start: 2022-06-10 | End: 2022-07-01

## 2022-06-10 NOTE — PROGRESS NOTES
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    HPI  Dx with an ingrown toenail on right big toe on 5/11/22, was doing well until he ran out of medication.     Agustin's allergies, medications, history, and problem list were updated as appropriate.    Review of Systems  A comprehensive review of symptoms was completed and negative except as noted in the HPI.        Subjective:   HPI:    his  ADHD symtoms have no change since last visit.    Attentive with homework: yes    Side effects of Medicine:  Sleep- yes  Appetite- yes,  irritability     Current ADHD Medication/Dose: Focalin XR 40mg            OBJECTIVE:  Vital signs  Vitals:    06/10/22 1339   BP: 133/70   Pulse: 86   Temp: 97.4 °F (36.3 °C)   Weight: 94.8 kg (209 lb)        Physical Exam  Constitutional:       General: He is not in acute distress.     Appearance: Normal appearance. He is normal weight. He is not ill-appearing or toxic-appearing.   HENT:      Head: Normocephalic.      Right Ear: Tympanic membrane, ear canal and external ear normal.      Left Ear: Tympanic membrane, ear canal and external ear normal.      Nose: Nose normal.      Mouth/Throat:      Mouth: Mucous membranes are moist.      Pharynx: Oropharynx is clear.   Eyes:      Extraocular Movements: Extraocular movements intact.      Conjunctiva/sclera: Conjunctivae normal.      Pupils: Pupils are equal, round, and reactive to light.   Cardiovascular:      Rate and Rhythm: Normal rate and regular rhythm.      Pulses: Normal pulses.      Heart sounds: Normal heart sounds. No murmur heard.  Pulmonary:      Effort: Pulmonary effort is normal.      Breath sounds: Normal breath sounds.   Abdominal:      General: Abdomen is flat. Bowel sounds are normal.      Palpations: Abdomen is soft.   Musculoskeletal:         General: Normal range of motion.      Cervical back: Normal range of motion and neck supple. No tenderness.   Lymphadenopathy:      Cervical: No cervical  adenopathy.   Skin:     General: Skin is warm.      Comments: Right great toe with significant discharge at nail along with significant swelling.   Neurological:      General: No focal deficit present.      Mental Status: He is alert and oriented to person, place, and time.      Gait: Tandem walk normal.   Psychiatric:         Mood and Affect: Mood normal.         Behavior: Behavior normal.         Thought Content: Thought content normal.            ASSESSMENT/PLAN:  Agustin was seen today for adhd and ingrown toenail.    Diagnoses and all orders for this visit:    Ingrown toenail    Other orders  -     clindamycin (CLEOCIN) 300 MG capsule; Take 1 capsule (300 mg total) by mouth 3 (three) times daily. for 21 days     Soak daily, TWICE A DAY -TID   Retract skin off the side each time   Let nail grow out   Cut only straight across in future.          Follow Up:  No follow-ups on file.

## 2022-08-15 ENCOUNTER — OFFICE VISIT (OUTPATIENT)
Dept: PEDIATRICS | Facility: CLINIC | Age: 22
End: 2022-08-15
Payer: COMMERCIAL

## 2022-08-15 VITALS
BODY MASS INDEX: 34.13 KG/M2 | SYSTOLIC BLOOD PRESSURE: 148 MMHG | DIASTOLIC BLOOD PRESSURE: 81 MMHG | TEMPERATURE: 98 F | HEIGHT: 66 IN | WEIGHT: 212.38 LBS | HEART RATE: 83 BPM

## 2022-08-15 DIAGNOSIS — F41.9 ANXIETY: ICD-10-CM

## 2022-08-15 DIAGNOSIS — F90.2 ADHD (ATTENTION DEFICIT HYPERACTIVITY DISORDER), COMBINED TYPE: Primary | ICD-10-CM

## 2022-08-15 DIAGNOSIS — Z00.00 WELL ADULT EXAM: ICD-10-CM

## 2022-08-15 PROCEDURE — 1160F PR REVIEW ALL MEDS BY PRESCRIBER/CLIN PHARMACIST DOCUMENTED: ICD-10-PCS | Mod: CPTII,S$GLB,, | Performed by: PEDIATRICS

## 2022-08-15 PROCEDURE — 99395 PREV VISIT EST AGE 18-39: CPT | Mod: 25,S$GLB,, | Performed by: PEDIATRICS

## 2022-08-15 PROCEDURE — 90715 TDAP VACCINE 7 YRS/> IM: CPT | Mod: S$GLB,,, | Performed by: PEDIATRICS

## 2022-08-15 PROCEDURE — 1160F RVW MEDS BY RX/DR IN RCRD: CPT | Mod: CPTII,S$GLB,, | Performed by: PEDIATRICS

## 2022-08-15 PROCEDURE — 99214 PR OFFICE/OUTPT VISIT, EST, LEVL IV, 30-39 MIN: ICD-10-PCS | Mod: 25,S$GLB,, | Performed by: PEDIATRICS

## 2022-08-15 PROCEDURE — 90471 IMMUNIZATION ADMIN: CPT | Mod: S$GLB,,, | Performed by: PEDIATRICS

## 2022-08-15 PROCEDURE — 3008F PR BODY MASS INDEX (BMI) DOCUMENTED: ICD-10-PCS | Mod: CPTII,S$GLB,, | Performed by: PEDIATRICS

## 2022-08-15 PROCEDURE — 99214 OFFICE O/P EST MOD 30 MIN: CPT | Mod: 25,S$GLB,, | Performed by: PEDIATRICS

## 2022-08-15 PROCEDURE — 90715 TDAP VACCINE GREATER THAN OR EQUAL TO 7YO IM: ICD-10-PCS | Mod: S$GLB,,, | Performed by: PEDIATRICS

## 2022-08-15 PROCEDURE — 3008F BODY MASS INDEX DOCD: CPT | Mod: CPTII,S$GLB,, | Performed by: PEDIATRICS

## 2022-08-15 PROCEDURE — 99999 PR PBB SHADOW E&M-EST. PATIENT-LVL III: ICD-10-PCS | Mod: PBBFAC,,, | Performed by: PEDIATRICS

## 2022-08-15 PROCEDURE — 90471 TDAP VACCINE GREATER THAN OR EQUAL TO 7YO IM: ICD-10-PCS | Mod: S$GLB,,, | Performed by: PEDIATRICS

## 2022-08-15 PROCEDURE — 3077F SYST BP >= 140 MM HG: CPT | Mod: CPTII,S$GLB,, | Performed by: PEDIATRICS

## 2022-08-15 PROCEDURE — 99395 PR PREVENTIVE VISIT,EST,18-39: ICD-10-PCS | Mod: 25,S$GLB,, | Performed by: PEDIATRICS

## 2022-08-15 PROCEDURE — 99999 PR PBB SHADOW E&M-EST. PATIENT-LVL III: CPT | Mod: PBBFAC,,, | Performed by: PEDIATRICS

## 2022-08-15 PROCEDURE — 1159F MED LIST DOCD IN RCRD: CPT | Mod: CPTII,S$GLB,, | Performed by: PEDIATRICS

## 2022-08-15 PROCEDURE — 3077F PR MOST RECENT SYSTOLIC BLOOD PRESSURE >= 140 MM HG: ICD-10-PCS | Mod: CPTII,S$GLB,, | Performed by: PEDIATRICS

## 2022-08-15 PROCEDURE — 3079F DIAST BP 80-89 MM HG: CPT | Mod: CPTII,S$GLB,, | Performed by: PEDIATRICS

## 2022-08-15 PROCEDURE — 3079F PR MOST RECENT DIASTOLIC BLOOD PRESSURE 80-89 MM HG: ICD-10-PCS | Mod: CPTII,S$GLB,, | Performed by: PEDIATRICS

## 2022-08-15 PROCEDURE — 1159F PR MEDICATION LIST DOCUMENTED IN MEDICAL RECORD: ICD-10-PCS | Mod: CPTII,S$GLB,, | Performed by: PEDIATRICS

## 2022-08-15 RX ORDER — FLUOXETINE HYDROCHLORIDE 40 MG/1
40 CAPSULE ORAL DAILY
Qty: 90 CAPSULE | Refills: 0 | Status: SHIPPED | OUTPATIENT
Start: 2022-08-15 | End: 2022-11-13

## 2022-08-15 RX ORDER — DEXTROAMPHETAMINE SACCHARATE, AMPHETAMINE ASPARTATE MONOHYDRATE, DEXTROAMPHETAMINE SULFATE AND AMPHETAMINE SULFATE 5; 5; 5; 5 MG/1; MG/1; MG/1; MG/1
40 CAPSULE, EXTENDED RELEASE ORAL EVERY MORNING
Qty: 60 CAPSULE | Refills: 0 | Status: SHIPPED | OUTPATIENT
Start: 2022-09-14 | End: 2022-10-14

## 2022-08-15 RX ORDER — DEXTROAMPHETAMINE SACCHARATE, AMPHETAMINE ASPARTATE MONOHYDRATE, DEXTROAMPHETAMINE SULFATE AND AMPHETAMINE SULFATE 5; 5; 5; 5 MG/1; MG/1; MG/1; MG/1
40 CAPSULE, EXTENDED RELEASE ORAL EVERY MORNING
Qty: 60 CAPSULE | Refills: 0 | Status: SHIPPED | OUTPATIENT
Start: 2022-10-14 | End: 2022-11-13

## 2022-08-15 RX ORDER — DEXTROAMPHETAMINE SACCHARATE, AMPHETAMINE ASPARTATE MONOHYDRATE, DEXTROAMPHETAMINE SULFATE AND AMPHETAMINE SULFATE 5; 5; 5; 5 MG/1; MG/1; MG/1; MG/1
40 CAPSULE, EXTENDED RELEASE ORAL EVERY MORNING
Qty: 60 CAPSULE | Refills: 0 | Status: SHIPPED | OUTPATIENT
Start: 2022-08-15 | End: 2022-09-14

## 2022-08-15 NOTE — PATIENT INSTRUCTIONS
Dr. Lewis, [facility] [place]  Pediatric and Adolescent Medicine  [phone]        [facility]    Nutrition:  - Limit snacks, fast food, dessert, junk food.  Eat regular meals    Teeth:  FLOSS, brush minimum twice a day  Fluoride mouth wash, i. e. Act  - Dentist regularly  - You do want teeth after fifty years old?    Toxics:  - Alcohol, drugs, tobacco  Lots of availability, temptation    Driving:  over 5,000 college age die and >500,000 are injured each year from MVAs  Look three cars ahead  Do NOT text and drive  Wear your seat belt  Use your mirrors with constant surveillance      Dating:  - [place] are sexually driven, girls have a need to be loved  - Do you want to be a parent right now?  - Abstain  - Learn from all your relationships  - If break-up, speak nicely of him/her  - You will find the right partner at the right time.    Academics:      - [employer]  Prepare- read through material before class  Attend class  Review notes same day as class  Back-it-up:  Before test study at least three days before test  Professor- pick, if possible, after reviews by friends or <[employer]> or <[employer]>  Meet your professor outside of class, so they know your face    -Extra help- take advantage of study skills classes, academic centers or tutors.  Get ahead of any potential problems.  - Ideas- study partners or groups, but must study (not just social)      Exercise:  Don't be a couch potato  Work toward an hour each day of aerobic exercise (min 30 minutes)  Play tennis, golf, swim, walk/run, etc    Stay in touch:  - Contact your parents regularly during your schooling and beyond- mom & dad  - text is really easy    Our office:  - We are honored to continue being your doctor at least until you finish schooling.  - Once 18 years old, you have doctor/patient confidentiality.  Your parents cannot receive information from us unless you allow.    Immunizations:  - MCV4 (Meningococcal), need two,  one after 16 years old  - TdaP (Tetanus)- within last 10 years  - HPV- helps cervical cancer in girls; urogenital cancer in boys and girls

## 2022-08-15 NOTE — PROGRESS NOTES
"  Subjective  Agustin Minor is a 22 y.o. male who is here for a checkup alone, who is a historian.      Subjective:     HISTORY:    Interval History / Parental Concerns: none    School: [employer], double majoring in Chemical Engineering and Biochemistry with minor in [place]  Grade: [employer] this fall   Progress/Grades:  3.1 GPA    Concerns:  Occasional heart rate spikes, hit 199 bpm a couple days ago, hit 180 x 1 day ago.  Both times exercising- back to normal in 5-10 minutes.      Subjective:   HPI:    his  ADHD symptoms have remained the same since last visit.    Attentive with homework: yes    Side effects of Medicine:  Sleep, appetite or other? Loss of appetite    Current ADHD Medication/Dose:  Focalin XR 40 mg                 Anxiety symptoms have been on and off since last visit.    Counselor, if seeing: no    Patient feels controlled on medication: majority of time   Panic attacks: none    Current Anxiety Medication/Dose: Fluoxetine 40mg  Any side effects of medication: dizziness, drowsiness, dry mouth, appetite changes?  Yes having trouble staying alseep    Review of patient's allergies indicates:   Allergen Reactions    Sulfa (sulfonamide antibiotics)        Patient Active Problem List   Diagnosis    Vasovagal syncope    ADHD (attention deficit hyperactivity disorder), combined type    Anxiety    Allergic rhinitis    Asthma           Objective:      PHYSICAL EXAM  Vitals:    08/15/22 0847   BP: (!) 148/81   BP Location: Left arm   Patient Position: Sitting   BP Method: Medium (Automatic)   Pulse: 83   Temp: 98.1 °F (36.7 °C)   TempSrc: Oral   Weight: 96.3 kg (212 lb 6 oz)   Height: 5' 6.25" (1.683 m)         Height Percentile for Age  Facility age limit for growth percentiles is 20 years.    Weight Percentile for Age  Facility age limit for growth percentiles is 20 years.    Body Mass Index  Body mass index is 34.02 kg/m².  Facility age limit for growth percentiles is 20 years.      Physical Exam  Vitals " reviewed.   Constitutional:       Appearance: Normal appearance.   HENT:      Right Ear: Tympanic membrane normal.      Left Ear: Tympanic membrane normal.      Nose: Nose normal.      Mouth/Throat:      Pharynx: Oropharynx is clear.   Eyes:      Conjunctiva/sclera: Conjunctivae normal.   Cardiovascular:      Rate and Rhythm: Normal rate and regular rhythm.      Heart sounds: Normal heart sounds. No murmur heard.    No friction rub. No gallop.   Pulmonary:      Breath sounds: Normal breath sounds.   Abdominal:      Palpations: Abdomen is soft.      Tenderness: There is no abdominal tenderness.   Musculoskeletal:         General: Normal range of motion.      Cervical back: Neck supple.   Skin:     Findings: No rash.   Neurological:      General: No focal deficit present.           Assessment/Plan:      ADHD (attention deficit hyperactivity disorder), combined type  -     dextroamphetamine-amphetamine (ADDERALL XR) 20 MG 24 hr capsule; Take 2 capsules (40 mg total) by mouth every morning.  Dispense: 60 capsule; Refill: 0  -     dextroamphetamine-amphetamine (ADDERALL XR) 20 MG 24 hr capsule; Take 2 capsules (40 mg total) by mouth every morning.  Dispense: 60 capsule; Refill: 0  -     dextroamphetamine-amphetamine (ADDERALL XR) 20 MG 24 hr capsule; Take 2 capsules (40 mg total) by mouth every morning.  Dispense: 60 capsule; Refill: 0    Well adult exam  -     Tdap Vaccine    Anxiety  -     FLUoxetine 40 MG capsule; Take 1 capsule (40 mg total) by mouth once daily.  Dispense: 90 capsule; Refill: 0      Healthy     PLAN:  1.  Discussed anticipatory guidance (including nutrition, education, safety, dental care, discipline, family) and given age appropriate hand out  2.  Immunizations received.  May give Acetaminophen (Tylenol).  3.  Discussed after hours care and advice - call [phone] (our office).  4.  Follow-up at next well child visit (Regular Supervision Visit) in one year or sooner prn.          Next scheduled  follow-up appointment for ADD/ADHD management will be in 3 months.  If changes are made to medications, then will need to follow up in three to four weeks.    We discussed the management goals for patients with ADHD:  1. Adequate Control of Focus and/or Behavior.  2. Tolerable Medication Side Effects (Including some Loss of Appetite).  Need to maintain weight.  3. Function well in life, school and/or work.        Next scheduled follow-up appointment for Anxiety management will be in 3 months.  If changes are made to medications, then will need to follow up in three to four weeks.  Call or see us immediately if self harm thoughts surface.        We discussed the management goals for patients with Anxiety:  1. Adequate Control of Anxiety.  2. Better understanding of anxious feelings.  3. Tolerable Medication Side-Effects (Including Loss of Appetite and Insomnia).  4. Function well in life, school and/or work.        Monitor Heart Rate- if continues spikes to tachycardia, Cardiology appointment with EKG, possible holter.

## 2022-09-12 ENCOUNTER — TELEPHONE (OUTPATIENT)
Dept: PEDIATRICS | Facility: CLINIC | Age: 22
End: 2022-09-12
Payer: COMMERCIAL

## 2022-09-12 NOTE — TELEPHONE ENCOUNTER
Called pharmacy to confirm. Did receive rxs. Just do not have medication in stock at this time. Pt informed, agrees. Pt Will contact pharm to order med for him.   ----- Message from Megan Maier sent at 9/12/2022 10:53 AM CDT -----  Pharmacy has not received ADHD medication from 2 weeks ago  Pharmacy: [facility] [address] [facility] [address]  Phone: [phone]

## 2022-10-12 ENCOUNTER — OFFICE VISIT (OUTPATIENT)
Dept: PEDIATRICS | Facility: CLINIC | Age: 22
End: 2022-10-12
Payer: COMMERCIAL

## 2022-10-12 VITALS — TEMPERATURE: 98 F | WEIGHT: 208.13 LBS | BODY MASS INDEX: 33.34 KG/M2

## 2022-10-12 DIAGNOSIS — R30.0 DYSURIA: Primary | ICD-10-CM

## 2022-10-12 DIAGNOSIS — R10.33 PERIUMBILICAL ABDOMINAL PAIN: ICD-10-CM

## 2022-10-12 LAB
BILIRUB SERPL-MCNC: NEGATIVE MG/DL
BLOOD URINE, POC: NEGATIVE
CLARITY, POC UA: CLEAR
COLOR, POC UA: NORMAL
GLUCOSE UR QL STRIP: NORMAL
KETONES UR QL STRIP: NEGATIVE
LEUKOCYTE ESTERASE URINE, POC: NEGATIVE
NITRITE, POC UA: NEGATIVE
PH, POC UA: 5
PROTEIN, POC: NEGATIVE
SPECIFIC GRAVITY, POC UA: 1.03
UROBILINOGEN, POC UA: NORMAL

## 2022-10-12 PROCEDURE — 1160F RVW MEDS BY RX/DR IN RCRD: CPT | Mod: CPTII,S$GLB,, | Performed by: PEDIATRICS

## 2022-10-12 PROCEDURE — 1159F MED LIST DOCD IN RCRD: CPT | Mod: CPTII,S$GLB,, | Performed by: PEDIATRICS

## 2022-10-12 PROCEDURE — 3008F BODY MASS INDEX DOCD: CPT | Mod: CPTII,S$GLB,, | Performed by: PEDIATRICS

## 2022-10-12 PROCEDURE — 81002 URINALYSIS NONAUTO W/O SCOPE: CPT | Mod: S$GLB,,, | Performed by: PEDIATRICS

## 2022-10-12 PROCEDURE — 99999 PR PBB SHADOW E&M-EST. PATIENT-LVL III: CPT | Mod: PBBFAC,,, | Performed by: PEDIATRICS

## 2022-10-12 PROCEDURE — 1159F PR MEDICATION LIST DOCUMENTED IN MEDICAL RECORD: ICD-10-PCS | Mod: CPTII,S$GLB,, | Performed by: PEDIATRICS

## 2022-10-12 PROCEDURE — 1160F PR REVIEW ALL MEDS BY PRESCRIBER/CLIN PHARMACIST DOCUMENTED: ICD-10-PCS | Mod: CPTII,S$GLB,, | Performed by: PEDIATRICS

## 2022-10-12 PROCEDURE — 99214 PR OFFICE/OUTPT VISIT, EST, LEVL IV, 30-39 MIN: ICD-10-PCS | Mod: S$GLB,,, | Performed by: PEDIATRICS

## 2022-10-12 PROCEDURE — 3008F PR BODY MASS INDEX (BMI) DOCUMENTED: ICD-10-PCS | Mod: CPTII,S$GLB,, | Performed by: PEDIATRICS

## 2022-10-12 PROCEDURE — 81002 POCT URINE DIPSTICK WITHOUT MICROSCOPE: ICD-10-PCS | Mod: S$GLB,,, | Performed by: PEDIATRICS

## 2022-10-12 PROCEDURE — 99214 OFFICE O/P EST MOD 30 MIN: CPT | Mod: S$GLB,,, | Performed by: PEDIATRICS

## 2022-10-12 PROCEDURE — 99999 PR PBB SHADOW E&M-EST. PATIENT-LVL III: ICD-10-PCS | Mod: PBBFAC,,, | Performed by: PEDIATRICS

## 2022-10-12 NOTE — PATIENT INSTRUCTIONS
Dr. Lewis, [facility] [place]  Pediatric and Adolescent Medicine  [phone]        VOMITING and DIARRHEA    What is vomiting and diarrhea?:   - Vomiting is forceful ejection of stomach contents through the mouth  - Diarrhea is sudden increased frequency or looseness of stools    Cause:  - Most commonly caused by viral infection, called gastroenteritis, diarrhea associated frequently    How long does it last?  - vomiting- a few days  - diarrhea- up to a week     Dehydration?  - No urination for long periods  - Crying with no tears, mouth dry  - Dizziness with standing  - Listlessness    Treatment- Dietary (not medicines):    INFANTS:  1. Nothing to eat or drink for 2 - 4 hours  - give your infant's belly a chance to rest  2.  Clear liquids (Pedialyte, water)  - start small amounts, i. e. 1 tsp to 1 tbsp every 15 minutes, then double this amount each hour;  advance as tolerated in frequency and amount  3.  Half strength to full strength formula or short breast feedings  4.  Older infants- bananas, rice cereal, apple sauce, mashed potatoes    CHILDREN/Adults:  1. Nothing to eat or drink for 3 - 4 hours  - give your child's belly a chance to rest  2.  Clear liquids (light colored Gatorade, Water, defizzed Sprite)  - start small amounts, frequently- start small amounts.  Advance as tolerated in frequency and amount  3.  [place]- bananas, rice, toast, mashed potatoes, crackers    REMEMBER:  - You need to give the belly a chance to rest;  Feeding too quickly after vomiting will result in vomiting, again    Contagiousness:  - Can return to /school after vomiting has resolved and diarrhea is controllable    Probiotics:  Probiotics, such as Culturelle or VSL3, can be given to aid gut healing after the acute phase    Call Immediately if:  - Your infant has not urinated in 12 hours  - Signs of dehydration develop  - Significant abdominal pain, sofya. RLQ  - Your child starts acting very sick     Call during  regular office hours if:  - Blood or mucus appears in the diarrhea  - Diarrhea persists longer than a week  - Persistent vomiting  - Persistent fever  - Your child is getting worse  - You have any concerns or questions

## 2022-10-12 NOTE — PROGRESS NOTES
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    HPI  Patient is here in today with concerns about  abdominal pain x 3 days.The pain was constant and felt on his right abdomen then moved to the center of his abdomen. Pt feels pain constantly but worsened when he makes sudden movements. Pt has been hydrating but urine has been oddly colored. Vomited last pm. Pain 5/10.        Agustin's allergies, medications, history, and problem list were updated as appropriate.    Review of Systems  A comprehensive review of symptoms was completed and negative except as noted in the HPI.    OBJECTIVE:  Vital signs  Vitals:    10/12/22 1055   Temp: 97.7 °F (36.5 °C)   TempSrc: Oral   Weight: 94.4 kg (208 lb 2 oz)        Physical Exam  Vitals reviewed.   Constitutional:       Appearance: Normal appearance.   HENT:      Right Ear: Tympanic membrane normal.      Left Ear: Tympanic membrane normal.      Nose: Nose normal.      Mouth/Throat:      Pharynx: Oropharynx is clear.   Eyes:      Conjunctiva/sclera: Conjunctivae normal.   Cardiovascular:      Rate and Rhythm: Normal rate and regular rhythm.      Heart sounds: Normal heart sounds. No murmur heard.    No friction rub. No gallop.   Pulmonary:      Breath sounds: Normal breath sounds.   Abdominal:      General: Abdomen is flat.      Palpations: Abdomen is soft.      Tenderness: There is abdominal tenderness.      Comments: Tender- periumbilical   Musculoskeletal:         General: Normal range of motion.      Cervical back: Neck supple.   Skin:     Findings: No rash.   Neurological:      General: No focal deficit present.         ASSESSMENT/PLAN:  Agustin was seen today for abdominal pain and dehydration.    Diagnoses and all orders for this visit:    Dysuria  -     POCT URINE DIPSTICK WITHOUT MICROSCOPE    Periumbilical abdominal pain     HO- Gastroenteritis    Handout provided  Follow instructions listed on hand out for treatment  Call or return to  clinic if worsens or does not resolve        Office Visit on 10/12/2022   Component Date Value Ref Range Status    Color, UA 10/12/2022 Susy   Final    pH, UA 10/12/2022 5   Final    WBC, UA 10/12/2022 negative   Final    Nitrite, UA 10/12/2022 negative   Final    Protein, POC 10/12/2022 negative   Final    Glucose, UA 10/12/2022 normal   Final    Ketones, UA 10/12/2022 negative   Final    Urobilinogen, UA 10/12/2022 normal   Final    Bilirubin, POC 10/12/2022 negative   Final    Blood, UA 10/12/2022 negative   Final    Clarity, UA 10/12/2022 Clear   Final    Spec Grav UA 10/12/2022 1.030   Final       Follow Up:  No follow-ups on file.    Close follow up;  Abd. Ultrasound consider if worsens.

## 2022-10-13 ENCOUNTER — TELEPHONE (OUTPATIENT)
Dept: PEDIATRICS | Facility: CLINIC | Age: 22
End: 2022-10-13
Payer: COMMERCIAL

## 2022-10-13 NOTE — TELEPHONE ENCOUNTER
Status check Thurs am. Doing much better per pt. Drinking fluids, eating light/bland diet. Abdominal pain has subsided some/continuing to improve. More mobile and better ambulation, getting around better per pt. Informed lots of water and clear fluids, activity as tolerates. If any worsening or severe abdominal pain, call prn. Can order abdominal u/s prn per Dr Lewis's visit notes. Pt agrees. Will continue current plan of care and call prn.   ----- Message from Martine Douglas RN sent at 10/12/2022  3:59 PM CDT -----  Regarding: Thurs am status check    Status check- Thursday, daily til better for abdominal pain.  GE, possible early kidney stone.  ----- Message -----  From: Ze Lewis II, MD  Sent: 10/12/2022  11:14 AM CDT  To: Debbie Kunz Staff    Status check- Thursday, daily til better for abdominal pain.  GE, possible early kidney stone.

## 2022-11-14 ENCOUNTER — OFFICE VISIT (OUTPATIENT)
Dept: PEDIATRICS | Facility: CLINIC | Age: 22
End: 2022-11-14
Payer: COMMERCIAL

## 2022-11-14 VITALS
SYSTOLIC BLOOD PRESSURE: 147 MMHG | WEIGHT: 208.38 LBS | HEART RATE: 104 BPM | HEIGHT: 67 IN | DIASTOLIC BLOOD PRESSURE: 87 MMHG | BODY MASS INDEX: 32.71 KG/M2 | TEMPERATURE: 98 F

## 2022-11-14 DIAGNOSIS — F90.2 ADHD (ATTENTION DEFICIT HYPERACTIVITY DISORDER), COMBINED TYPE: Primary | ICD-10-CM

## 2022-11-14 DIAGNOSIS — J02.9 PHARYNGITIS, UNSPECIFIED ETIOLOGY: ICD-10-CM

## 2022-11-14 DIAGNOSIS — J45.909 ASTHMA, UNSPECIFIED ASTHMA SEVERITY, UNSPECIFIED WHETHER COMPLICATED, UNSPECIFIED WHETHER PERSISTENT: ICD-10-CM

## 2022-11-14 DIAGNOSIS — J06.9 UPPER RESPIRATORY TRACT INFECTION, UNSPECIFIED TYPE: ICD-10-CM

## 2022-11-14 DIAGNOSIS — F41.9 ANXIETY: ICD-10-CM

## 2022-11-14 LAB
CTP QC/QA: YES
CTP QC/QA: YES
FLUAV AG NPH QL: NEGATIVE
FLUBV AG NPH QL: NEGATIVE
S PYO RRNA THROAT QL PROBE: NEGATIVE

## 2022-11-14 PROCEDURE — 3077F PR MOST RECENT SYSTOLIC BLOOD PRESSURE >= 140 MM HG: ICD-10-PCS | Mod: CPTII,S$GLB,, | Performed by: PEDIATRICS

## 2022-11-14 PROCEDURE — 1159F MED LIST DOCD IN RCRD: CPT | Mod: CPTII,S$GLB,, | Performed by: PEDIATRICS

## 2022-11-14 PROCEDURE — 1160F PR REVIEW ALL MEDS BY PRESCRIBER/CLIN PHARMACIST DOCUMENTED: ICD-10-PCS | Mod: CPTII,S$GLB,, | Performed by: PEDIATRICS

## 2022-11-14 PROCEDURE — 87880 STREP A ASSAY W/OPTIC: CPT | Mod: QW,S$GLB,, | Performed by: PEDIATRICS

## 2022-11-14 PROCEDURE — 1160F RVW MEDS BY RX/DR IN RCRD: CPT | Mod: CPTII,S$GLB,, | Performed by: PEDIATRICS

## 2022-11-14 PROCEDURE — 87804 INFLUENZA ASSAY W/OPTIC: CPT | Mod: 59,QW,S$GLB, | Performed by: PEDIATRICS

## 2022-11-14 PROCEDURE — 1159F PR MEDICATION LIST DOCUMENTED IN MEDICAL RECORD: ICD-10-PCS | Mod: CPTII,S$GLB,, | Performed by: PEDIATRICS

## 2022-11-14 PROCEDURE — 3079F DIAST BP 80-89 MM HG: CPT | Mod: CPTII,S$GLB,, | Performed by: PEDIATRICS

## 2022-11-14 PROCEDURE — 87804 POCT INFLUENZA A/B: ICD-10-PCS | Mod: 59,QW,S$GLB, | Performed by: PEDIATRICS

## 2022-11-14 PROCEDURE — 99999 PR PBB SHADOW E&M-EST. PATIENT-LVL IV: ICD-10-PCS | Mod: PBBFAC,,, | Performed by: PEDIATRICS

## 2022-11-14 PROCEDURE — 87880 POCT RAPID STREP A: ICD-10-PCS | Mod: QW,S$GLB,, | Performed by: PEDIATRICS

## 2022-11-14 PROCEDURE — 3008F BODY MASS INDEX DOCD: CPT | Mod: CPTII,S$GLB,, | Performed by: PEDIATRICS

## 2022-11-14 PROCEDURE — 3079F PR MOST RECENT DIASTOLIC BLOOD PRESSURE 80-89 MM HG: ICD-10-PCS | Mod: CPTII,S$GLB,, | Performed by: PEDIATRICS

## 2022-11-14 PROCEDURE — 3008F PR BODY MASS INDEX (BMI) DOCUMENTED: ICD-10-PCS | Mod: CPTII,S$GLB,, | Performed by: PEDIATRICS

## 2022-11-14 PROCEDURE — 3077F SYST BP >= 140 MM HG: CPT | Mod: CPTII,S$GLB,, | Performed by: PEDIATRICS

## 2022-11-14 PROCEDURE — 99999 PR PBB SHADOW E&M-EST. PATIENT-LVL IV: CPT | Mod: PBBFAC,,, | Performed by: PEDIATRICS

## 2022-11-14 PROCEDURE — 99214 PR OFFICE/OUTPT VISIT, EST, LEVL IV, 30-39 MIN: ICD-10-PCS | Mod: 25,S$GLB,, | Performed by: PEDIATRICS

## 2022-11-14 PROCEDURE — 99214 OFFICE O/P EST MOD 30 MIN: CPT | Mod: 25,S$GLB,, | Performed by: PEDIATRICS

## 2022-11-14 RX ORDER — DEXMETHYLPHENIDATE HYDROCHLORIDE 40 MG/1
40 CAPSULE, EXTENDED RELEASE ORAL EVERY MORNING
Qty: 30 CAPSULE | Refills: 0 | Status: SHIPPED | OUTPATIENT
Start: 2022-11-14 | End: 2022-12-14

## 2022-11-14 RX ORDER — ALBUTEROL SULFATE 90 UG/1
AEROSOL, METERED RESPIRATORY (INHALATION)
Qty: 18 G | Refills: 0 | Status: SHIPPED | OUTPATIENT
Start: 2022-11-14 | End: 2022-12-12

## 2022-11-14 RX ORDER — DEXMETHYLPHENIDATE HYDROCHLORIDE 40 MG/1
40 CAPSULE, EXTENDED RELEASE ORAL EVERY MORNING
Qty: 30 CAPSULE | Refills: 0 | Status: SHIPPED | OUTPATIENT
Start: 2023-01-13 | End: 2023-02-12

## 2022-11-14 RX ORDER — FLUOXETINE HYDROCHLORIDE 40 MG/1
40 CAPSULE ORAL DAILY
Qty: 90 CAPSULE | Refills: 0 | Status: SHIPPED | OUTPATIENT
Start: 2022-11-14 | End: 2023-02-12

## 2022-11-14 RX ORDER — DEXTROMETHORPHAN HYDROBROMIDE, GUAIFENESIN AND PHENYLEPHRINE HYDROCHLORIDE 15; 400; 10 MG/1; MG/1; MG/1
1 TABLET ORAL
Qty: 30 TABLET | Refills: 0 | Status: SHIPPED | OUTPATIENT
Start: 2022-11-14 | End: 2024-03-05

## 2022-11-14 RX ORDER — DEXMETHYLPHENIDATE HYDROCHLORIDE 40 MG/1
40 CAPSULE, EXTENDED RELEASE ORAL EVERY MORNING
Qty: 30 CAPSULE | Refills: 0 | Status: SHIPPED | OUTPATIENT
Start: 2022-12-14 | End: 2023-01-13

## 2022-11-14 NOTE — PATIENT INSTRUCTIONS
"[facility] [place]  P[facility] and Adolescent Medicine  [phone]      ASTHMA      What is Asthma?:  - Long term recurrent lung issue  - Sensitive airways that narrow and become inflamed  - Characterized by wheezing- a high pitched sound produced during breathing  - May just be cough without wheeze  - Breathing becomes difficult    Cause:  - Runs in families (inherited)  - Twitchy airways causing narrowing when irritated by URIs or smoke or inhalants or weather or exercise  - Common in children that have a parent with asthma, dermatitis or allergic rhinitis    How long does it last?  - Asthma is treatable, but can be a long-lasting disease  - About half of children "outgrow" asthma during adolescence    Medicines:    BRONCHODILATORS - quick relief  - Albuterol (Proair, Ventolin, Xoponex)  --Multidose inhaler (MDI)- 2 -4 puffs every 4 - 6 hours or Nebulizer- weaned as able    CONTROLLERS- long term control by reducing inflammation with Inhaled Corticosteroid- use 1 - 2 times per day  - Flovent  - Asmanex- twisthaler  - QVAR- MDI  - Advair- discus (also has LA bdilator)  - Pulmicort- nebulized     Singulair -leukotriene inhibitor  - reduces inflammation  - also helps with allergic rhinitis    Steroids -anti-inflammatory  - reduces inflammation  - used intermittently for short term help    Current Treatment Plan:  1. Albuterol (Proair, Ventolin) 2-4 puffs every four to six hours as needed.  - If young may need spacer to assure inhaler medicine goes to lungs  2.  Use the controller (Flovent, Pulmicort, etc) twice a day for seven days, then once a day  3.  For IMMEDIATE HELP- Albuterol  4.  Short term steroids may be needed  4.  Hydration is important    Future Treatment Plan:  If starts to become symptomatic- wheezing or coughin. Albuterol (Proair, Ventolin) 2-4 puffs every four to six hours as needed.  - If young may need spacer to assure inhaler medicine goes to lungs  2.  Use the " controller (Flovent, Pulmicort, etc) twice a day for seven days, then once a day  3.  For IMMEDIATE HELP- Albuterol  5.  Short term steroids may be needed  6.  Hydration is important      Exercise induced treatment plan:  Albuterol 5 - 15 minutes before exercise  Warm up before exercise    School Treatment Plan:  If wheezing, excused from PE  Keep Albuterol inhaler at school    Call Immediately if:  - Wheezing is severe or breathing labored    Call during regular office hours if:  - Fluid intake is poor  - Fever or other signs of infection  - Wheezing is not cleared in a few days  You have any concerns or questions    ** NEED REGULAR OFFICE VISITS EVERY FEW MONTHS**

## 2022-11-14 NOTE — PROGRESS NOTES
Subjective:   HPI:  Agustin Minor is a 22 y.o. patient here for follow up ADHD visit,  alone, who is a historian    his  ADHD symptoms have gotten worse since last visit.    Attentive in afternoon (with homework): no, time of focus is decreased  Wakes up at 4:30 am;  Medicine- at 5 am, works until 12 noonish.    Side effects of Medicine:    Trouble Sleeping; Appetite Changes; Jitteriness; Irritability or moodiness; Headaches or Stomach Aches; Other? none    Level/Grade in School:[employer], Majoring Double Biochemistry and Chemical Engineering with minor in Yi- will graduating 5/25  Performance: 3.0 GPA    Accommodations? No    Current ADHD Medication/Dose in am: Adderall XR 20 mg twice a day- was out of Focalin XR 40 mg at pharmacy   Afternoon medicine?   Dose-  none   Taking daily? Yes    Concerns? Yes           Anxiety symptoms have improved since last visit.    Counselor, if seeing: none    Patient feels controlled on medication: yes  Panic attacks: none    Current Anxiety Medication/Dose: Prozac 40 mg  Any side effects of medication: dizziness, drowsiness, dry mouth, appetite changes?  none    Last RHS:  08/15/22  SUBJECTIVE:      HPI  Patient is here today with concerns about  sore throat and headache x 1 week. Pt's headache is felt in temple. Pt has decreased appetite. Pt has been taking xyzal has helped with congestion but not cough.         Agustin's allergies, medications, history, and problem list were updated as appropriate.    Review of Systems  A comprehensive review of symptoms was completed and negative except as noted in the HPI.          Agustin's allergies, medications, history, and problem list were updated as appropriate.    Review of patient's allergies indicates:   Allergen Reactions    Sulfa (sulfonamide antibiotics) Swelling       Patient Active Problem List   Diagnosis    Vasovagal syncope    ADHD (attention deficit hyperactivity disorder), combined type    Anxiety    Allergic rhinitis     "Asthma         Review of Systems  A comprehensive review of symptoms was completed and negative except as noted in the HPI.      Objective:     Vitals:    11/14/22 0838   BP: (!) 147/87   BP Location: Right arm   Patient Position: Sitting   BP Method: Medium (Automatic)   Pulse: 104   Temp: 97.8 °F (36.6 °C)   TempSrc: Oral   Weight: 94.5 kg (208 lb 6 oz)   Height: 5' 6.5" (1.689 m)       Last 3 Weights:   Wt Readings from Last 3 Encounters:   11/14/22 0838 94.5 kg (208 lb 6 oz)   10/12/22 1055 94.4 kg (208 lb 2 oz)   08/15/22 0847 96.3 kg (212 lb 6 oz)         Physical Exam  Vitals reviewed.   Constitutional:       Appearance: Normal appearance.   HENT:      Right Ear: Tympanic membrane normal.      Left Ear: Tympanic membrane normal.      Nose: Nose normal.      Mouth/Throat:      Pharynx: Posterior oropharyngeal erythema present.   Eyes:      Conjunctiva/sclera: Conjunctivae normal.   Cardiovascular:      Rate and Rhythm: Normal rate and regular rhythm.      Heart sounds: Normal heart sounds. No murmur heard.    No friction rub. No gallop.   Pulmonary:      Breath sounds: Normal breath sounds.   Abdominal:      Palpations: Abdomen is soft.      Tenderness: There is no abdominal tenderness.   Musculoskeletal:         General: Normal range of motion.      Cervical back: Neck supple.   Skin:     Findings: No rash.   Neurological:      General: No focal deficit present.         Assessment/Plan:        ADHD (attention deficit hyperactivity disorder), combined type  -     dexmethylphenidate (FOCALIN XR) 40 mg 24 hr capsule; Take 40 mg by mouth every morning.  Dispense: 30 capsule; Refill: 0  -     dexmethylphenidate (FOCALIN XR) 40 mg 24 hr capsule; Take 40 mg by mouth every morning.  Dispense: 30 capsule; Refill: 0  -     dexmethylphenidate (FOCALIN XR) 40 mg 24 hr capsule; Take 40 mg by mouth every morning.  Dispense: 30 capsule; Refill: 0    Anxiety  -     FLUoxetine 40 MG capsule; Take 1 capsule (40 mg total) by " mouth once daily.  Dispense: 90 capsule; Refill: 0    Upper respiratory tract infection, unspecified type  -     POCT Rapid Strep A  -     POCT INFLUENZA A/B  -     phenylephrine-DM-guaiFENesin (AQUANAZ) 10- mg Tab; Take 1 tablet by mouth every 4 to 6 hours as needed (for congestion/cough).  Dispense: 30 tablet; Refill: 0    Pharyngitis, unspecified etiology  -     POCT Rapid Strep A  -     POCT INFLUENZA A/B    Asthma, unspecified asthma severity, unspecified whether complicated, unspecified whether persistent  -     albuterol (PROAIR HFA) 90 mcg/actuation inhaler; 2-4 puffs every four to six hours for wheezing  Dispense: 18 g; Refill: 0  -     mometasone (ASMANEX TWISTHALER) 110 mcg/ actuation (30) AePB; Inhale 1 puff into the lungs 2 (two) times daily. Controller  Dispense: 1 each; Refill: 0    HO- Asthma follow up    Handout provided  Follow instructions listed on hand out for treatment  Call or return to clinic if worsens or does not resolve            Outpatient Encounter Medications as of 11/14/2022   Medication Sig Dispense Refill    albuterol (PROVENTIL/VENTOLIN HFA) 90 mcg/actuation inhaler       esomeprazole (NEXIUM) 40 MG capsule Take 1 capsule (40 mg total) by mouth once daily. 30 capsule 1    FLUoxetine 40 MG capsule Take 40 mg by mouth once daily.      levocetirizine (XYZAL) 5 MG tablet Take 5 mg by mouth.      mometasone (ASMANEX TWISTHALER) 220 mcg/ actuation (120) AePB Inhale into the lungs. Controller      SYMBICORT 160-4.5 mcg/actuation HFAA       albuterol (PROAIR HFA) 90 mcg/actuation inhaler 2-4 puffs every four to six hours for wheezing 18 g 0    ALPRAZolam (XANAX) 0.5 MG tablet Take 1 tablet (0.5 mg total) by mouth as needed for Anxiety. Take 0.25 one hour before appointment 3 tablet 0    dexmethylphenidate (FOCALIN XR) 30 mg 24 hr capsule Take 1 capsule by mouth once daily. (Patient not taking: Reported on 11/14/2022) 30 capsule 0    dexmethylphenidate (FOCALIN XR) 40 mg 24 hr capsule  Take 40 mg by mouth every morning. 30 capsule 0    [START ON 12/14/2022] dexmethylphenidate (FOCALIN XR) 40 mg 24 hr capsule Take 40 mg by mouth every morning. 30 capsule 0    [START ON 1/13/2023] dexmethylphenidate (FOCALIN XR) 40 mg 24 hr capsule Take 40 mg by mouth every morning. 30 capsule 0    dextroamphetamine-amphetamine (ADDERALL XR) 20 MG 24 hr capsule Take 2 capsules (40 mg total) by mouth every morning. 60 capsule 0    FLUoxetine 40 MG capsule Take 1 capsule (40 mg total) by mouth once daily. (Patient not taking: Reported on 10/12/2022) 90 capsule 0    FLUoxetine 40 MG capsule Take 1 capsule (40 mg total) by mouth once daily. 90 capsule 0    hydrocortisone 2.5 % ointment APPLY EXTERNALLY TO THE AFFECTED AREA ON NECK TWICE DAILY AS NEEDED      mometasone (ASMANEX TWISTHALER) 110 mcg/ actuation (30) AePB Inhale 1 puff into the lungs 2 (two) times daily. Controller 1 each 0    phenylephrine-DM-guaiFENesin (AQUANAZ) 10- mg Tab Take 1 tablet by mouth every 4 to 6 hours as needed (for congestion/cough). 30 tablet 0    triamcinolone acetonide 0.1% (KENALOG) 0.1 % paste APPLY TO ULCER THREE TIMES DAILY AS NEEDED       No facility-administered encounter medications on file as of 11/14/2022.         Next scheduled follow-up appointment for ADD/ADHD management will be in 3 months.  If changes are made to medications, then will need to follow up in three to four weeks.    We discussed the management goals for patients with ADHD:  1. Adequate Control of Focus and/or Behavior.  2. Tolerable Medication Side-Effects (Including some Loss of Appetite).  Need to maintain weight.  3. Function well in life, school and/or work.

## 2023-02-06 ENCOUNTER — PATIENT MESSAGE (OUTPATIENT)
Dept: ADMINISTRATIVE | Facility: HOSPITAL | Age: 23
End: 2023-02-06
Payer: COMMERCIAL

## 2023-02-13 ENCOUNTER — OFFICE VISIT (OUTPATIENT)
Dept: PEDIATRICS | Facility: CLINIC | Age: 23
End: 2023-02-13
Payer: COMMERCIAL

## 2023-02-13 VITALS
HEART RATE: 64 BPM | WEIGHT: 216.25 LBS | BODY MASS INDEX: 33.94 KG/M2 | DIASTOLIC BLOOD PRESSURE: 70 MMHG | SYSTOLIC BLOOD PRESSURE: 147 MMHG | HEIGHT: 67 IN | TEMPERATURE: 98 F

## 2023-02-13 DIAGNOSIS — F41.9 ANXIETY: ICD-10-CM

## 2023-02-13 DIAGNOSIS — F90.2 ADHD (ATTENTION DEFICIT HYPERACTIVITY DISORDER), COMBINED TYPE: Primary | ICD-10-CM

## 2023-02-13 PROCEDURE — 1160F PR REVIEW ALL MEDS BY PRESCRIBER/CLIN PHARMACIST DOCUMENTED: ICD-10-PCS | Mod: CPTII,S$GLB,, | Performed by: PEDIATRICS

## 2023-02-13 PROCEDURE — 3008F BODY MASS INDEX DOCD: CPT | Mod: CPTII,S$GLB,, | Performed by: PEDIATRICS

## 2023-02-13 PROCEDURE — 1160F RVW MEDS BY RX/DR IN RCRD: CPT | Mod: CPTII,S$GLB,, | Performed by: PEDIATRICS

## 2023-02-13 PROCEDURE — 99214 PR OFFICE/OUTPT VISIT, EST, LEVL IV, 30-39 MIN: ICD-10-PCS | Mod: S$GLB,,, | Performed by: PEDIATRICS

## 2023-02-13 PROCEDURE — 3077F SYST BP >= 140 MM HG: CPT | Mod: CPTII,S$GLB,, | Performed by: PEDIATRICS

## 2023-02-13 PROCEDURE — 3008F PR BODY MASS INDEX (BMI) DOCUMENTED: ICD-10-PCS | Mod: CPTII,S$GLB,, | Performed by: PEDIATRICS

## 2023-02-13 PROCEDURE — 99999 PR PBB SHADOW E&M-EST. PATIENT-LVL IV: CPT | Mod: PBBFAC,,, | Performed by: PEDIATRICS

## 2023-02-13 PROCEDURE — 3078F DIAST BP <80 MM HG: CPT | Mod: CPTII,S$GLB,, | Performed by: PEDIATRICS

## 2023-02-13 PROCEDURE — 1159F MED LIST DOCD IN RCRD: CPT | Mod: CPTII,S$GLB,, | Performed by: PEDIATRICS

## 2023-02-13 PROCEDURE — 1159F PR MEDICATION LIST DOCUMENTED IN MEDICAL RECORD: ICD-10-PCS | Mod: CPTII,S$GLB,, | Performed by: PEDIATRICS

## 2023-02-13 PROCEDURE — 3077F PR MOST RECENT SYSTOLIC BLOOD PRESSURE >= 140 MM HG: ICD-10-PCS | Mod: CPTII,S$GLB,, | Performed by: PEDIATRICS

## 2023-02-13 PROCEDURE — 3078F PR MOST RECENT DIASTOLIC BLOOD PRESSURE < 80 MM HG: ICD-10-PCS | Mod: CPTII,S$GLB,, | Performed by: PEDIATRICS

## 2023-02-13 PROCEDURE — 99999 PR PBB SHADOW E&M-EST. PATIENT-LVL IV: ICD-10-PCS | Mod: PBBFAC,,, | Performed by: PEDIATRICS

## 2023-02-13 PROCEDURE — 99214 OFFICE O/P EST MOD 30 MIN: CPT | Mod: S$GLB,,, | Performed by: PEDIATRICS

## 2023-02-13 RX ORDER — DEXMETHYLPHENIDATE HYDROCHLORIDE 40 MG/1
40 CAPSULE, EXTENDED RELEASE ORAL EVERY MORNING
Qty: 30 CAPSULE | Refills: 0 | Status: SHIPPED | OUTPATIENT
Start: 2023-03-15 | End: 2023-04-14

## 2023-02-13 RX ORDER — DEXMETHYLPHENIDATE HYDROCHLORIDE 40 MG/1
40 CAPSULE, EXTENDED RELEASE ORAL EVERY MORNING
Qty: 30 CAPSULE | Refills: 0 | Status: SHIPPED | OUTPATIENT
Start: 2023-04-14 | End: 2023-05-14

## 2023-02-13 RX ORDER — FLUOXETINE HYDROCHLORIDE 40 MG/1
40 CAPSULE ORAL DAILY
Qty: 90 CAPSULE | Refills: 0 | Status: SHIPPED | OUTPATIENT
Start: 2023-02-13 | End: 2023-05-14

## 2023-02-13 RX ORDER — DEXMETHYLPHENIDATE HYDROCHLORIDE 40 MG/1
40 CAPSULE, EXTENDED RELEASE ORAL EVERY MORNING
Qty: 30 CAPSULE | Refills: 0 | Status: SHIPPED | OUTPATIENT
Start: 2023-02-13 | End: 2023-03-15

## 2023-02-13 NOTE — PROGRESS NOTES
"    Subjective:   HPI:  Agustin Minor is a 22 y.o. patient here for follow up ADHD visit,  alone, who is a historian    his  ADHD symptoms have remained the same since last visit.    Attentive in afternoon (with homework): yes    Side effects of Medicine:    Trouble Sleeping; Appetite Changes; Jitteriness; Irritability or moodiness; Headaches or Stomach Aches; Other? none    Level/Grade in School:[employer], Majoring Double Biochemistry and Chemical Engineering with minor in Pashto  Performance: 3.0 gpa    Accommodations? No    Current ADHD Medication/Dose in am: Focalin XR 40 mg   Afternoon medicine?   Dose- none   Taking daily? Yes    Concerns? no       Anxiety symptoms have improved since last visit.    Counselor, if seeing: no    Patient feels controlled on medication: yes  Panic attacks: none    Current Anxiety Medication/Dose:  Prozac 40 mg  Any side effects of medication: dizziness, drowsiness, dry mouth, appetite changes?  none    Agustin's allergies, medications, history, and problem list were updated as appropriate.    Review of patient's allergies indicates:   Allergen Reactions    Sulfa (sulfonamide antibiotics) Swelling       Patient Active Problem List   Diagnosis    Vasovagal syncope    ADHD (attention deficit hyperactivity disorder), combined type    Anxiety    Allergic rhinitis    Asthma         Review of Systems  A comprehensive review of symptoms was completed and negative except as noted in the HPI.      Objective:     Vitals:    02/13/23 0828   BP: (!) 147/70   BP Location: Right arm   Patient Position: Sitting   BP Method: Medium (Automatic)   Pulse: 64   Temp: 97.7 °F (36.5 °C)   TempSrc: Oral   Weight: 98.1 kg (216 lb 4 oz)   Height: 5' 6.5" (1.689 m)       Last 3 Weights:   Wt Readings from Last 3 Encounters:   02/13/23 0828 98.1 kg (216 lb 4 oz)   11/14/22 0838 94.5 kg (208 lb 6 oz)   10/12/22 1055 94.4 kg (208 lb 2 oz)         Physical Exam  Vitals reviewed.   Constitutional:       Appearance: " Normal appearance.   HENT:      Right Ear: Tympanic membrane normal.      Left Ear: Tympanic membrane normal.      Nose: Nose normal.      Mouth/Throat:      Pharynx: Oropharynx is clear.   Eyes:      Conjunctiva/sclera: Conjunctivae normal.   Cardiovascular:      Rate and Rhythm: Normal rate and regular rhythm.      Heart sounds: Normal heart sounds. No murmur heard.    No friction rub. No gallop.   Pulmonary:      Breath sounds: Normal breath sounds.   Abdominal:      Palpations: Abdomen is soft.      Tenderness: There is no abdominal tenderness.   Musculoskeletal:         General: Normal range of motion.      Cervical back: Neck supple.   Skin:     Findings: No rash.   Neurological:      General: No focal deficit present.         Assessment/Plan:        ADHD (attention deficit hyperactivity disorder), combined type  -     dexmethylphenidate (FOCALIN XR) 40 mg 24 hr capsule; Take 40 mg by mouth every morning.  Dispense: 30 capsule; Refill: 0  -     dexmethylphenidate (FOCALIN XR) 40 mg 24 hr capsule; Take 40 mg by mouth every morning.  Dispense: 30 capsule; Refill: 0  -     dexmethylphenidate (FOCALIN XR) 40 mg 24 hr capsule; Take 40 mg by mouth every morning.  Dispense: 30 capsule; Refill: 0    Anxiety  -     FLUoxetine 40 MG capsule; Take 1 capsule (40 mg total) by mouth once daily.  Dispense: 90 capsule; Refill: 0            Outpatient Encounter Medications as of 2/13/2023   Medication Sig Dispense Refill    albuterol (PROVENTIL/VENTOLIN HFA) 90 mcg/actuation inhaler INHALE 2 TO 4 PUFFS INTO THE LUNGS EVERY 4 TO 6 HOURS AS NEEDED FOR WHEEZING 18 g 0    FLUoxetine 40 MG capsule Take 40 mg by mouth once daily.      levocetirizine (XYZAL) 5 MG tablet Take 5 mg by mouth.      mometasone (ASMANEX TWISTHALER) 110 mcg/ actuation (30) AePB Inhale 1 puff into the lungs 2 (two) times daily. Controller 1 each 0    mometasone (ASMANEX TWISTHALER) 220 mcg/ actuation (120) AePB Inhale into the lungs. Controller       SYMBICORT 160-4.5 mcg/actuation HFAA       albuterol (PROVENTIL/VENTOLIN HFA) 90 mcg/actuation inhaler       ALPRAZolam (XANAX) 0.5 MG tablet Take 1 tablet (0.5 mg total) by mouth as needed for Anxiety. Take 0.25 one hour before appointment 3 tablet 0    dexmethylphenidate (FOCALIN XR) 30 mg 24 hr capsule Take 1 capsule by mouth once daily. (Patient not taking: Reported on 11/14/2022) 30 capsule 0    dexmethylphenidate (FOCALIN XR) 40 mg 24 hr capsule Take 40 mg by mouth every morning. 30 capsule 0    dexmethylphenidate (FOCALIN XR) 40 mg 24 hr capsule Take 40 mg by mouth every morning. 30 capsule 0    [START ON 3/15/2023] dexmethylphenidate (FOCALIN XR) 40 mg 24 hr capsule Take 40 mg by mouth every morning. 30 capsule 0    [START ON 4/14/2023] dexmethylphenidate (FOCALIN XR) 40 mg 24 hr capsule Take 40 mg by mouth every morning. 30 capsule 0    dextroamphetamine-amphetamine (ADDERALL XR) 20 MG 24 hr capsule Take 2 capsules (40 mg total) by mouth every morning. 60 capsule 0    esomeprazole (NEXIUM) 40 MG capsule Take 1 capsule (40 mg total) by mouth once daily. (Patient not taking: Reported on 2/13/2023) 30 capsule 1    FLUoxetine 40 MG capsule Take 1 capsule (40 mg total) by mouth once daily. 90 capsule 0    FLUoxetine 40 MG capsule Take 1 capsule (40 mg total) by mouth once daily. 90 capsule 0    hydrocortisone 2.5 % ointment APPLY EXTERNALLY TO THE AFFECTED AREA ON NECK TWICE DAILY AS NEEDED      phenylephrine-DM-guaiFENesin (AQUANAZ) 10- mg Tab Take 1 tablet by mouth every 4 to 6 hours as needed (for congestion/cough). (Patient not taking: Reported on 2/13/2023) 30 tablet 0    triamcinolone acetonide 0.1% (KENALOG) 0.1 % paste APPLY TO ULCER THREE TIMES DAILY AS NEEDED       No facility-administered encounter medications on file as of 2/13/2023.         Next scheduled follow-up appointment for ADD/ADHD management will be in 3 months.  If changes are made to medications, then will need to follow up in three  to four weeks.    We discussed the management goals for patients with ADHD:  1. Adequate Control of Focus and/or Behavior.  2. Tolerable Medication Side-Effects (Including some Loss of Appetite).  Need to maintain weight.  3. Function well in life, school and/or work.

## 2023-03-08 ENCOUNTER — OFFICE VISIT (OUTPATIENT)
Dept: PEDIATRICS | Facility: CLINIC | Age: 23
End: 2023-03-08
Payer: COMMERCIAL

## 2023-03-08 VITALS — BODY MASS INDEX: 33.96 KG/M2 | WEIGHT: 213.63 LBS | TEMPERATURE: 98 F

## 2023-03-08 DIAGNOSIS — R50.9 FEVER, UNSPECIFIED FEVER CAUSE: ICD-10-CM

## 2023-03-08 DIAGNOSIS — B34.9 VIRAL SYNDROME: ICD-10-CM

## 2023-03-08 DIAGNOSIS — R11.2 NAUSEA AND VOMITING, UNSPECIFIED VOMITING TYPE: ICD-10-CM

## 2023-03-08 DIAGNOSIS — R10.9 ABDOMINAL PAIN, UNSPECIFIED ABDOMINAL LOCATION: Primary | ICD-10-CM

## 2023-03-08 LAB
CTP QC/QA: YES
CTP QC/QA: YES
S PYO RRNA THROAT QL PROBE: NEGATIVE
SARS-COV-2 RDRP RESP QL NAA+PROBE: NEGATIVE

## 2023-03-08 PROCEDURE — 99214 PR OFFICE/OUTPT VISIT, EST, LEVL IV, 30-39 MIN: ICD-10-PCS | Mod: 25,S$GLB,, | Performed by: PEDIATRICS

## 2023-03-08 PROCEDURE — 87880 POCT RAPID STREP A: ICD-10-PCS | Mod: QW,S$GLB,, | Performed by: PEDIATRICS

## 2023-03-08 PROCEDURE — 87635 SARS-COV-2 COVID-19 AMP PRB: CPT | Mod: QW,S$GLB,, | Performed by: PEDIATRICS

## 2023-03-08 PROCEDURE — 99999 PR PBB SHADOW E&M-EST. PATIENT-LVL III: CPT | Mod: PBBFAC,,, | Performed by: PEDIATRICS

## 2023-03-08 PROCEDURE — 99214 OFFICE O/P EST MOD 30 MIN: CPT | Mod: 25,S$GLB,, | Performed by: PEDIATRICS

## 2023-03-08 PROCEDURE — 1159F PR MEDICATION LIST DOCUMENTED IN MEDICAL RECORD: ICD-10-PCS | Mod: CPTII,S$GLB,, | Performed by: PEDIATRICS

## 2023-03-08 PROCEDURE — 3008F BODY MASS INDEX DOCD: CPT | Mod: CPTII,S$GLB,, | Performed by: PEDIATRICS

## 2023-03-08 PROCEDURE — 87635: ICD-10-PCS | Mod: QW,S$GLB,, | Performed by: PEDIATRICS

## 2023-03-08 PROCEDURE — 87880 STREP A ASSAY W/OPTIC: CPT | Mod: QW,S$GLB,, | Performed by: PEDIATRICS

## 2023-03-08 PROCEDURE — 99999 PR PBB SHADOW E&M-EST. PATIENT-LVL III: ICD-10-PCS | Mod: PBBFAC,,, | Performed by: PEDIATRICS

## 2023-03-08 PROCEDURE — 3008F PR BODY MASS INDEX (BMI) DOCUMENTED: ICD-10-PCS | Mod: CPTII,S$GLB,, | Performed by: PEDIATRICS

## 2023-03-08 PROCEDURE — 1159F MED LIST DOCD IN RCRD: CPT | Mod: CPTII,S$GLB,, | Performed by: PEDIATRICS

## 2023-03-08 RX ORDER — ONDANSETRON 8 MG/1
8 TABLET, ORALLY DISINTEGRATING ORAL EVERY 6 HOURS PRN
Qty: 8 TABLET | Refills: 0 | Status: SHIPPED | OUTPATIENT
Start: 2023-03-08 | End: 2024-03-05

## 2023-03-08 RX ORDER — PREDNISONE 10 MG/1
TABLET ORAL
COMMUNITY
Start: 2022-10-13 | End: 2024-03-05

## 2023-03-08 RX ORDER — AZELASTINE 1 MG/ML
1 SPRAY, METERED NASAL 2 TIMES DAILY
COMMUNITY
Start: 2022-10-13

## 2023-03-08 RX ORDER — AMOXICILLIN AND CLAVULANATE POTASSIUM 500; 125 MG/1; MG/1
1 TABLET, FILM COATED ORAL 2 TIMES DAILY
COMMUNITY
Start: 2022-11-09 | End: 2024-03-05

## 2023-03-08 NOTE — PROGRESS NOTES
SUBJECTIVE:  Agustin Minor is a 22 y.o. male here alone.     HPI  Agustin presents to clinic today due to abdominal pain . Abdominal pain started 2 nights ago. Pain continues throughout the whole day. Pt denies fever but having chills and sweating at nightPt denies any change in diet. When pt eats, he throws up 30 minutes to an hour after he eats. Pt is having regular bowel movements. Pt has not been taking any medicine for pain.  Cramping pain. No reflux symptoms, no diarrhea  Agustin was here 03/29/22 and 10/12/22 due to abdominal pain.     Agustin's allergies, medications, history, and problem list were updated as appropriate.    Review of Systems  A comprehensive review of symptoms was completed and negative except as noted in the HPI.    OBJECTIVE:  Vital signs  Vitals:    03/08/23 1512   Temp: 97.5 °F (36.4 °C)   TempSrc: Oral   Weight: 96.9 kg (213 lb 9.6 oz)        Physical Exam  Vitals and nursing note reviewed. Exam conducted with a chaperone present.   Constitutional:       General: He is not in acute distress.     Appearance: Normal appearance. He is not ill-appearing, toxic-appearing or diaphoretic.   HENT:      Right Ear: Tympanic membrane, ear canal and external ear normal.      Left Ear: Tympanic membrane, ear canal and external ear normal.      Mouth/Throat:      Pharynx: Posterior oropharyngeal erythema present.   Eyes:      Conjunctiva/sclera: Conjunctivae normal.   Cardiovascular:      Rate and Rhythm: Normal rate and regular rhythm.      Pulses: Normal pulses.      Heart sounds: Normal heart sounds.   Pulmonary:      Effort: Pulmonary effort is normal.      Breath sounds: Normal breath sounds.   Abdominal:      General: Bowel sounds are normal. There is no distension.      Palpations: Abdomen is soft.      Tenderness: There is no abdominal tenderness. There is no guarding or rebound.   Musculoskeletal:         General: Normal range of motion.      Cervical back: Normal range of motion and neck supple.    Skin:     General: Skin is warm.      Capillary Refill: Capillary refill takes less than 2 seconds.   Neurological:      Mental Status: He is alert.         ASSESSMENT/PLAN:  Agustin was seen today for abdominal pain.    Diagnoses and all orders for this visit:    Abdominal pain, unspecified abdominal location    Fever, unspecified fever cause  -     POCT rapid strep A  -     POCT COVID-19 Rapid Screening    Nausea and vomiting, unspecified vomiting type    Viral syndrome    Other orders  -     ondansetron (ZOFRAN-ODT) 8 MG TbDL; Take 1 tablet (8 mg total) by mouth every 6 (six) hours as needed (nausea).       Fluids 4est  Office Visit on 03/08/2023   Component Date Value Ref Range Status    Rapid Strep A Screen 03/08/2023 Negative  Negative Final     Acceptable 03/08/2023 Yes   Final    POC Rapid COVID 03/08/2023 Negative  Negative Final     Acceptable 03/08/2023 Yes   Final       Follow Up:  No follow-ups on file.

## 2023-05-15 ENCOUNTER — OFFICE VISIT (OUTPATIENT)
Dept: PEDIATRICS | Facility: CLINIC | Age: 23
End: 2023-05-15
Payer: COMMERCIAL

## 2023-05-15 VITALS
SYSTOLIC BLOOD PRESSURE: 140 MMHG | BODY MASS INDEX: 33.79 KG/M2 | DIASTOLIC BLOOD PRESSURE: 74 MMHG | HEART RATE: 77 BPM | HEIGHT: 67 IN | TEMPERATURE: 99 F | WEIGHT: 215.25 LBS

## 2023-05-15 DIAGNOSIS — F90.2 ADHD (ATTENTION DEFICIT HYPERACTIVITY DISORDER), COMBINED TYPE: Primary | ICD-10-CM

## 2023-05-15 DIAGNOSIS — J45.909 ASTHMA, UNSPECIFIED ASTHMA SEVERITY, UNSPECIFIED WHETHER COMPLICATED, UNSPECIFIED WHETHER PERSISTENT: ICD-10-CM

## 2023-05-15 DIAGNOSIS — F41.9 ANXIETY: ICD-10-CM

## 2023-05-15 DIAGNOSIS — Z00.00 WELL ADULT EXAM: ICD-10-CM

## 2023-05-15 PROCEDURE — 99395 PREV VISIT EST AGE 18-39: CPT | Mod: S$GLB,,, | Performed by: PEDIATRICS

## 2023-05-15 PROCEDURE — 99214 PR OFFICE/OUTPT VISIT, EST, LEVL IV, 30-39 MIN: ICD-10-PCS | Mod: 25,S$GLB,, | Performed by: PEDIATRICS

## 2023-05-15 PROCEDURE — 3008F PR BODY MASS INDEX (BMI) DOCUMENTED: ICD-10-PCS | Mod: CPTII,S$GLB,, | Performed by: PEDIATRICS

## 2023-05-15 PROCEDURE — 1159F PR MEDICATION LIST DOCUMENTED IN MEDICAL RECORD: ICD-10-PCS | Mod: CPTII,S$GLB,, | Performed by: PEDIATRICS

## 2023-05-15 PROCEDURE — 99999 PR PBB SHADOW E&M-EST. PATIENT-LVL IV: CPT | Mod: PBBFAC,,, | Performed by: PEDIATRICS

## 2023-05-15 PROCEDURE — 99214 OFFICE O/P EST MOD 30 MIN: CPT | Mod: 25,S$GLB,, | Performed by: PEDIATRICS

## 2023-05-15 PROCEDURE — 3077F SYST BP >= 140 MM HG: CPT | Mod: CPTII,S$GLB,, | Performed by: PEDIATRICS

## 2023-05-15 PROCEDURE — 3077F PR MOST RECENT SYSTOLIC BLOOD PRESSURE >= 140 MM HG: ICD-10-PCS | Mod: CPTII,S$GLB,, | Performed by: PEDIATRICS

## 2023-05-15 PROCEDURE — 3008F BODY MASS INDEX DOCD: CPT | Mod: CPTII,S$GLB,, | Performed by: PEDIATRICS

## 2023-05-15 PROCEDURE — 3078F PR MOST RECENT DIASTOLIC BLOOD PRESSURE < 80 MM HG: ICD-10-PCS | Mod: CPTII,S$GLB,, | Performed by: PEDIATRICS

## 2023-05-15 PROCEDURE — 99395 PR PREVENTIVE VISIT,EST,18-39: ICD-10-PCS | Mod: S$GLB,,, | Performed by: PEDIATRICS

## 2023-05-15 PROCEDURE — 1159F MED LIST DOCD IN RCRD: CPT | Mod: CPTII,S$GLB,, | Performed by: PEDIATRICS

## 2023-05-15 PROCEDURE — 3078F DIAST BP <80 MM HG: CPT | Mod: CPTII,S$GLB,, | Performed by: PEDIATRICS

## 2023-05-15 PROCEDURE — 99999 PR PBB SHADOW E&M-EST. PATIENT-LVL IV: ICD-10-PCS | Mod: PBBFAC,,, | Performed by: PEDIATRICS

## 2023-05-15 RX ORDER — DEXMETHYLPHENIDATE HYDROCHLORIDE 40 MG/1
40 CAPSULE, EXTENDED RELEASE ORAL EVERY MORNING
Qty: 30 CAPSULE | Refills: 0 | Status: SHIPPED | OUTPATIENT
Start: 2023-07-14 | End: 2023-08-13

## 2023-05-15 RX ORDER — DEXMETHYLPHENIDATE HYDROCHLORIDE 40 MG/1
40 CAPSULE, EXTENDED RELEASE ORAL EVERY MORNING
Qty: 30 CAPSULE | Refills: 0 | Status: SHIPPED | OUTPATIENT
Start: 2023-05-15 | End: 2023-06-14

## 2023-05-15 RX ORDER — DEXMETHYLPHENIDATE HYDROCHLORIDE 40 MG/1
40 CAPSULE, EXTENDED RELEASE ORAL EVERY MORNING
Qty: 30 CAPSULE | Refills: 0 | Status: SHIPPED | OUTPATIENT
Start: 2023-06-14 | End: 2023-07-14

## 2023-05-15 RX ORDER — FLUOXETINE HYDROCHLORIDE 40 MG/1
40 CAPSULE ORAL DAILY
Qty: 90 CAPSULE | Refills: 0 | Status: SHIPPED | OUTPATIENT
Start: 2023-05-15 | End: 2023-08-13

## 2023-05-15 RX ORDER — ALBUTEROL SULFATE 90 UG/1
AEROSOL, METERED RESPIRATORY (INHALATION)
Qty: 18 G | Refills: 0 | Status: SHIPPED | OUTPATIENT
Start: 2023-05-15

## 2023-05-15 NOTE — PROGRESS NOTES
"  Subjective  Agustin Minor is a 23 y.o. male who is here for a checkup alone, who is a historian.      Subjective:     HISTORY:    Interval History / Parental Concerns: refill inhaler    School:[employer] majoring in Chemical Engineering and Biochemistry - hopeful of graduation 12/24  Grade: Senior year this fall   Progress/Grades:  2.9 GPA    Concerns:  none    Taking two summer classes and working 40 hours per week at [employer]      Subjective:   HPI:    his  ADHD symptoms have improved since last visit.    Attentive with homework: yes    Side effects of Medicine:  Sleep, appetite or other? none    Current ADHD Medication/Dose: Focalin XR 40 mg               Anxiety symptoms have remained the since last visit.    Counselor, if seeing:     Patient feels controlled on medication: yes  Panic attacks: none    Current Anxiety Medication/Dose: Prozac 40 mg  Any side effects of medication: dizziness, drowsiness, dry mouth, appetite changes?  none      Review of patient's allergies indicates:   Allergen Reactions    Sulfa (sulfonamide antibiotics) Swelling       Patient Active Problem List   Diagnosis    Vasovagal syncope    ADHD (attention deficit hyperactivity disorder), combined type    Anxiety    Allergic rhinitis    Asthma           Objective:      PHYSICAL EXAM  Vitals:    05/15/23 0904   BP: (!) 140/74   BP Location: Left arm   Patient Position: Sitting   BP Method: Medium (Automatic)   Pulse: 77   Temp: 98.5 °F (36.9 °C)   TempSrc: Oral   Weight: 97.6 kg (215 lb 4 oz)   Height: 5' 6.75" (1.695 m)         Height Percentile for Age  Facility age limit for growth percentiles is 20 years.    Weight Percentile for Age  Facility age limit for growth percentiles is 20 years.    Body Mass Index  Body mass index is 33.97 kg/m².  Facility age limit for growth percentiles is 20 years.      Physical Exam      Assessment/Plan:      ADHD (attention deficit hyperactivity disorder), combined type  -     dexmethylphenidate (FOCALIN " XR) 40 mg 24 hr capsule; Take 40 mg by mouth every morning.  Dispense: 30 capsule; Refill: 0  -     dexmethylphenidate (FOCALIN XR) 40 mg 24 hr capsule; Take 40 mg by mouth every morning.  Dispense: 30 capsule; Refill: 0  -     dexmethylphenidate (FOCALIN XR) 40 mg 24 hr capsule; Take 40 mg by mouth every morning.  Dispense: 30 capsule; Refill: 0    Well adult exam    Anxiety  -     FLUoxetine 40 MG capsule; Take 1 capsule (40 mg total) by mouth once daily.  Dispense: 90 capsule; Refill: 0      Healthy     PLAN:  1.  Discussed anticipatory guidance (including nutrition, education, safety, dental care, discipline, family, exercise, physical acticvity) and given age appropriate hand out.   Pediatric Physical Activity and Nutritional Counseling  2.  Immunizations received.  May give Acetaminophen (Tylenol).  3.  Discussed after hours care and advice - call [phone] (our office).  4.  Follow-up at next well child visit (Regular Supervision Visit) in one year or sooner prn.

## 2023-05-15 NOTE — PATIENT INSTRUCTIONS
Dr. Lewis, [facility] [place]  Pediatric and Adolescent Medicine  [phone]        [facility]    Nutrition:  - Limit snacks, fast food, dessert, junk food.  Eat regular meals    Teeth:  FLOSS, brush minimum twice a day  Fluoride mouth wash, i. e. Act  - Dentist regularly  - You do want teeth after fifty years old?    Toxics:  - Alcohol, drugs, tobacco  Lots of availability, temptation    Driving:  over 5,000 college age die and >500,000 are injured each year from MVAs  Look three cars ahead  Do NOT text and drive  Wear your seat belt  Use your mirrors with constant surveillance      Dating:  - [place] are sexually driven, girls have a need to be loved  - Do you want to be a parent right now?  - Abstain  - Learn from all your relationships  - If break-up, speak nicely of him/her  - You will find the right partner at the right time.    Academics:      - Formula for Success  Prepare- read through material before class  Attend class  Review notes same day as class  Back-it-up:  Before test study at least three days before test  Professor- pick, if possible, after reviews by friends or <[employer]> or <[employer]>  Meet your professor outside of class, so they know your face    -Extra help- take advantage of study skills classes, academic centers or tutors.  Get ahead of any potential problems.  - Ideas- study partners or groups, but must study (not just social)      Exercise:  Don't be a couch potato  Work toward an hour each day of aerobic exercise (min 30 minutes)  Play tennis, golf, swim, walk/run, etc    Stay in touch:  - Contact your parents regularly during your schooling and beyond- mom & dad  - text is really easy    Our office:  - We are honored to continue being your doctor at least until you finish schooling.  - Once 18 years old, you have doctor/patient confidentiality.  Your parents cannot receive information from us unless you allow.    Immunizations:  - MCV4 (Meningococcal), need two,  one after 16 years old  - TdaP (Tetanus)- within last 10 years  - HPV- helps cervical cancer in girls; urogenital cancer in boys and girls

## 2023-06-05 ENCOUNTER — OFFICE VISIT (OUTPATIENT)
Dept: PEDIATRICS | Facility: CLINIC | Age: 23
End: 2023-06-05
Payer: COMMERCIAL

## 2023-06-05 VITALS — WEIGHT: 215.63 LBS | TEMPERATURE: 99 F | BODY MASS INDEX: 33.84 KG/M2 | HEIGHT: 67 IN

## 2023-06-05 DIAGNOSIS — K52.9 GASTROENTERITIS: Primary | ICD-10-CM

## 2023-06-05 PROCEDURE — 1160F PR REVIEW ALL MEDS BY PRESCRIBER/CLIN PHARMACIST DOCUMENTED: ICD-10-PCS | Mod: CPTII,S$GLB,, | Performed by: PEDIATRICS

## 2023-06-05 PROCEDURE — 99213 OFFICE O/P EST LOW 20 MIN: CPT | Mod: S$GLB,,, | Performed by: PEDIATRICS

## 2023-06-05 PROCEDURE — 3008F BODY MASS INDEX DOCD: CPT | Mod: CPTII,S$GLB,, | Performed by: PEDIATRICS

## 2023-06-05 PROCEDURE — 1159F PR MEDICATION LIST DOCUMENTED IN MEDICAL RECORD: ICD-10-PCS | Mod: CPTII,S$GLB,, | Performed by: PEDIATRICS

## 2023-06-05 PROCEDURE — 3008F PR BODY MASS INDEX (BMI) DOCUMENTED: ICD-10-PCS | Mod: CPTII,S$GLB,, | Performed by: PEDIATRICS

## 2023-06-05 PROCEDURE — 1159F MED LIST DOCD IN RCRD: CPT | Mod: CPTII,S$GLB,, | Performed by: PEDIATRICS

## 2023-06-05 PROCEDURE — 99999 PR PBB SHADOW E&M-EST. PATIENT-LVL IV: ICD-10-PCS | Mod: PBBFAC,,, | Performed by: PEDIATRICS

## 2023-06-05 PROCEDURE — 99999 PR PBB SHADOW E&M-EST. PATIENT-LVL IV: CPT | Mod: PBBFAC,,, | Performed by: PEDIATRICS

## 2023-06-05 PROCEDURE — 1160F RVW MEDS BY RX/DR IN RCRD: CPT | Mod: CPTII,S$GLB,, | Performed by: PEDIATRICS

## 2023-06-05 PROCEDURE — 99213 PR OFFICE/OUTPT VISIT, EST, LEVL III, 20-29 MIN: ICD-10-PCS | Mod: S$GLB,,, | Performed by: PEDIATRICS

## 2023-06-05 NOTE — PROGRESS NOTES
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    HPI  Patient is here today with concerns about   abdominal pain x 2 days. Pt's abdominal pain is centralized to the center of stomach. Pt has been constipated x 2 days. Pt's stools have been green per patient. Pt's last BM was x 2 days ago. Pt has been nauseated.     Vomited Sunday am.  None since    Agustin's allergies, medications, history, and problem list were updated as appropriate.    Review of Systems  A comprehensive review of symptoms was completed and negative except as noted in the HPI.    OBJECTIVE:  Vital signs  Vitals:    06/05/23 1127   Temp: 99 °F (37.2 °C)   TempSrc: Oral   Weight: 97.8 kg (215 lb 9.6 oz)   Height: 5' 6.75" (1.695 m)        Physical Exam  Vitals reviewed.   Constitutional:       Appearance: Normal appearance.   HENT:      Right Ear: Tympanic membrane normal.      Left Ear: Tympanic membrane normal.      Nose: Nose normal.      Mouth/Throat:      Pharynx: Oropharynx is clear.   Eyes:      Conjunctiva/sclera: Conjunctivae normal.   Cardiovascular:      Rate and Rhythm: Normal rate and regular rhythm.      Heart sounds: Normal heart sounds. No murmur heard.    No friction rub. No gallop.   Pulmonary:      Breath sounds: Normal breath sounds.   Abdominal:      General: Abdomen is flat. There is no distension.      Palpations: Abdomen is soft.      Tenderness: There is no abdominal tenderness. There is no guarding or rebound.   Musculoskeletal:         General: Normal range of motion.      Cervical back: Neck supple.   Skin:     Findings: No rash.   Neurological:      General: No focal deficit present.         ASSESSMENT/PLAN:  Agustin was seen today for abdominal pain, constipation and nausea.    Diagnoses and all orders for this visit:    Gastroenteritis         No results found for this or any previous visit (from the past 672 hour(s)).    HO- Gastroenteritis    Handout provided  Follow instructions listed on " hand out for treatment  Call or return to clinic if worsens or does not resolve      Follow Up:  No follow-ups on file.

## 2023-06-05 NOTE — PATIENT INSTRUCTIONS
Dr. Lewis, Salazar Castro Cook  Pediatric and Adolescent Medicine  [phone]        VOMITING and DIARRHEA    What is vomiting and diarrhea?:   - Vomiting is forceful ejection of stomach contents through the mouth  - Diarrhea is sudden increased frequency or looseness of stools    Cause:  - Most commonly caused by viral infection, called gastroenteritis, diarrhea associated frequently    How long does it last?  - vomiting- a few days  - diarrhea- up to a week     Dehydration?  - No urination for long periods  - Crying with no tears, mouth dry  - Dizziness with standing  - Listlessness    Treatment- Dietary (not medicines):    INFANTS:  1. Nothing to eat or drink for 2 - 4 hours  - give your infant's belly a chance to rest  2.  Clear liquids (Pedialyte, water)  - start small amounts, i. e. 1 tsp to 1 tbsp every 15 minutes, then double this amount each hour;  advance as tolerated in frequency and amount  3.  Half strength to full strength formula or short breast feedings  4.  Older infants- bananas, rice cereal, apple sauce, mashed potatoes    CHILDREN/Adults:  1. Nothing to eat or drink for 3 - 4 hours  - give your child's belly a chance to rest  2.  Clear liquids (light colored Gatorade, Water, defizzed Sprite)  - start small amounts, frequently- start small amounts.  Advance as tolerated in frequency and amount  3.  [place]- bananas, rice, toast, mashed potatoes, crackers    REMEMBER:  - You need to give the belly a chance to rest;  Feeding too quickly after vomiting will result in vomiting, again    Contagiousness:  - Can return to /school after vomiting has resolved and diarrhea is controllable    Probiotics:  Probiotics, such as Culturelle or VSL3, can be given to aid gut healing after the acute phase    Call Immediately if:  - Your infant has not urinated in 12 hours  - Signs of dehydration develop  - Significant abdominal pain, sofya. RLQ  - Your child starts acting very sick     Call during  regular office hours if:  - Blood or mucus appears in the diarrhea  - Diarrhea persists longer than a week  - Persistent vomiting  - Persistent fever  - Your child is getting worse  - You have any concerns or questions

## 2023-07-18 ENCOUNTER — OFFICE VISIT (OUTPATIENT)
Dept: PEDIATRICS | Facility: CLINIC | Age: 23
End: 2023-07-18
Payer: COMMERCIAL

## 2023-07-18 VITALS
HEART RATE: 91 BPM | TEMPERATURE: 98 F | SYSTOLIC BLOOD PRESSURE: 138 MMHG | DIASTOLIC BLOOD PRESSURE: 80 MMHG | BODY MASS INDEX: 34.02 KG/M2 | WEIGHT: 215.63 LBS

## 2023-07-18 DIAGNOSIS — G43.809 OTHER MIGRAINE WITHOUT STATUS MIGRAINOSUS, NOT INTRACTABLE: Primary | ICD-10-CM

## 2023-07-18 PROCEDURE — 3008F PR BODY MASS INDEX (BMI) DOCUMENTED: ICD-10-PCS | Mod: CPTII,S$GLB,, | Performed by: PEDIATRICS

## 2023-07-18 PROCEDURE — 3080F DIAST BP >= 90 MM HG: CPT | Mod: CPTII,S$GLB,, | Performed by: PEDIATRICS

## 2023-07-18 PROCEDURE — 3080F PR MOST RECENT DIASTOLIC BLOOD PRESSURE >= 90 MM HG: ICD-10-PCS | Mod: CPTII,S$GLB,, | Performed by: PEDIATRICS

## 2023-07-18 PROCEDURE — 3077F SYST BP >= 140 MM HG: CPT | Mod: CPTII,S$GLB,, | Performed by: PEDIATRICS

## 2023-07-18 PROCEDURE — 1159F PR MEDICATION LIST DOCUMENTED IN MEDICAL RECORD: ICD-10-PCS | Mod: CPTII,S$GLB,, | Performed by: PEDIATRICS

## 2023-07-18 PROCEDURE — 3077F PR MOST RECENT SYSTOLIC BLOOD PRESSURE >= 140 MM HG: ICD-10-PCS | Mod: CPTII,S$GLB,, | Performed by: PEDIATRICS

## 2023-07-18 PROCEDURE — 99213 OFFICE O/P EST LOW 20 MIN: CPT | Mod: S$GLB,,, | Performed by: PEDIATRICS

## 2023-07-18 PROCEDURE — 99999 PR PBB SHADOW E&M-EST. PATIENT-LVL IV: ICD-10-PCS | Mod: PBBFAC,,, | Performed by: PEDIATRICS

## 2023-07-18 PROCEDURE — 99999 PR PBB SHADOW E&M-EST. PATIENT-LVL IV: CPT | Mod: PBBFAC,,, | Performed by: PEDIATRICS

## 2023-07-18 PROCEDURE — 99213 PR OFFICE/OUTPT VISIT, EST, LEVL III, 20-29 MIN: ICD-10-PCS | Mod: S$GLB,,, | Performed by: PEDIATRICS

## 2023-07-18 PROCEDURE — 3008F BODY MASS INDEX DOCD: CPT | Mod: CPTII,S$GLB,, | Performed by: PEDIATRICS

## 2023-07-18 PROCEDURE — 1159F MED LIST DOCD IN RCRD: CPT | Mod: CPTII,S$GLB,, | Performed by: PEDIATRICS

## 2023-07-18 RX ORDER — RIZATRIPTAN BENZOATE 10 MG/1
10 TABLET, ORALLY DISINTEGRATING ORAL
Qty: 6 TABLET | Refills: 0 | Status: SHIPPED | OUTPATIENT
Start: 2023-07-18 | End: 2023-08-17

## 2023-07-18 RX ORDER — NAPROXEN 500 MG/1
500 TABLET ORAL 2 TIMES DAILY WITH MEALS
Qty: 60 TABLET | Refills: 1 | Status: SHIPPED | OUTPATIENT
Start: 2023-07-18

## 2023-07-18 NOTE — PATIENT INSTRUCTIONS
Dr. Lewis, Salazar Castro Rancocas  Pediatric and Adolescent Medicine  [phone]        HEADACHES    Frequency of Headaches (HA):  - Frequent HA in 20% of school age children (10% have weekly)  - sometimes hard to differentiate what type of headache    TYPES of Headaches:  MIGRAINE Headaches:  - family history common  - repeated x at least five, last longer than 2 hours  - can be one-sided, throbbing pain, moderate to severe intensity, worsens with physical activity  - aggravated by light or sound, nausea/vomiting  --Classic- nausea, vomiting, aura before, maybe visual/sensory/speech symptoms  --Common- younger children, no aura or visual changes    TENSION or STRESS Headaches:  - dull, steady, constant, generalized pain, can occur daily, sleep does not help  - start toward middle of the day,     SECONDARY HEADACHES from:  Head/neck trauma, vascular disorders, infection, psychiatric disorders, allergies    Treatment:  - Goal of treatment may not be never having headaches again, but controlling the pain and decreasing the frequency  - Try to treat when mild before they becomes more painful  - Be careful not to use medicines too frequently may cause Rebound HA    MEDICAL TREATMENTS:  1.  Initial treatment- analgesics  -- Naprosyn (Aleve) or Ibuprofen (Advil)  -- Acetaminophen (Tylenol)      2.  Severe, Migraine HA:  -5-HTP agonists/Triptans   *may repeat in 2 hours  --Maxalt-MLT (5, 10 mg.)- Oral Dis Tabs- approved for children older than 6 years old  - Imitrex- 25, 50 or 100 mg. tabs (No more than 200 mg in 24 hours)  - Imitrex 5, 10 or 20 mg nasal spray (no more than 40 mg in 24 hour period)  -SE- drowsiness, dizziness, dry mouth, flushing, tiredness/weakness    3.  Calcium channel blockers (Verapamil), 5.  Beta blockers (Atenolol)    What else can be done to help a HA?  1.  Heat or ice pack on head or neck  2.  Take a hot shower or bath  3.  Sleep, darkness in a quiet room    Preventative (Prophylactic)  Medicines:  - Take on daily basis to prevent HAs  - May take 1 - 2 months to help  1.  Topamax- seizure medicine  SE- dizziness, fatigue, nervousness   2.  Elavil- TriCyclic antidepressant  SE- dry mouth, drowsiness  3.  Periactin- Antihistamine (2 - 4mg bid)  SE- drowsiness  **once started will monitor with office visit in 3 weeks, then every 3 months    PREVENTION:  - Getting enough sleep, eating well and exercising will reduce headaches by 40%; reduce screen time.   - Identify triggers by keeping headache diary- keeping track of when, duration, location of HA, associated symptoms, severity (1-10), possible triggers, what helped to resolve    Further diagnostic studies:  - Consider MRI of head if paralysis or other focal neurological sign, abnormal physical exam, abrupt onset of new headaches, progressively worsening HA,    Vitamin supplements to prevent HAs:  1.  Riboflavin (Vitamin B2)- 400 mg. daily may reduce frequency of HAs  - water soluble vitamin  -SE- urine may turn fluorescent yellow  2.  Magnesium Oxide- reduce severity?  - in whole grains, nuts and green veggies  - dosage- 9 mg/kg per day, up to 600 mg per day, up to three times a day  - take with meal, full glass of water  - SE- diarrhea  3.  Coenzyme Q10- reduces frequency?  - enzyme catalyst helps human body cells produce energy, antioxidant  - up to 100 mg three times a day  - more expensive ($30/ month)  4.  Butterburr Root- Petadolex  - herb that inhibits inflammation, antispasmodic, calcium channel blocker  - 50 to 150 mg. per day  SE- burping      Common Dietary triggers:  - Caffeine (Cokes, tea, coffee)  - Chocolate  - MSG (Chinese foods, soups, processed meats, croutons)  - Nitrites (in hot dogs, deli meats, jaimes, salami, pepperoni)  - Cheese/dairy products  - Condiments (ketchup, mustard, bennett)  - Fruits/ juices (citrus, raisins, raspberries, red plums, papaya, avocado, banana, dates)  - Some veggies (ramya, white & lima beans, lentils,  sauerkraut, pea pods)  - Yeasty baked goods (doughnuts, sourdough, bagels, pizza dough, soft pretzels)  - Artificial sweeteners (Aspartame)  - Snack foods (chips, TV dinners)  - Fermented or pickled foods  - Wine and beer    Call Immediately if:  - Your child starts acting very sick  - Significant weakness develops on one side of body or other focal neurological change    Call during office hours if:  - HAs are getting worse  - HAs waking from sleep  - Worse in the morning  - Persistent vomiting  - Changes in behavior or personality  - You have any concerns or questions

## 2023-07-18 NOTE — PROGRESS NOTES
SUBJECTIVE:  Agustin Minor is a 23 y.o. 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    HPI  Patient is here today with concerns about intense migraines since Saturday. Pt does not have light sensitivity, no dizziness, and no sense to vomit during migraines. Pt feels intense pressure and white blotches behind the right eye. Pt has been taking ibuprofen with a little relief.   Taking Ibuprofen- two couple times a day.  No weakness;  Some white spots with right eye.      Agustin's allergies, medications, history, and problem list were updated as appropriate.    Review of Systems  A comprehensive review of symptoms was completed and negative except as noted in the HPI.    OBJECTIVE:  Vital signs  Vitals:    07/18/23 1136   BP: (!) 157/117   BP Location: Left arm   Patient Position: Sitting   BP Method: Large (Automatic)   Pulse: 91   Temp: 98 °F (36.7 °C)   TempSrc: Oral   Weight: 97.8 kg (215 lb 9.6 oz)        Physical Exam  Vitals reviewed.   Constitutional:       Appearance: Normal appearance.   HENT:      Right Ear: Tympanic membrane normal.      Left Ear: Tympanic membrane normal.      Nose: Nose normal.      Mouth/Throat:      Pharynx: Oropharynx is clear.   Eyes:      Conjunctiva/sclera: Conjunctivae normal.   Cardiovascular:      Rate and Rhythm: Normal rate and regular rhythm.      Heart sounds: Normal heart sounds. No murmur heard.    No friction rub. No gallop.   Pulmonary:      Breath sounds: Normal breath sounds.   Abdominal:      Palpations: Abdomen is soft.      Tenderness: There is no abdominal tenderness.   Musculoskeletal:         General: Normal range of motion.      Cervical back: Neck supple.   Skin:     Findings: No rash.   Neurological:      General: No focal deficit present.      Cranial Nerves: No cranial nerve deficit.      Sensory: No sensory deficit.      Motor: No weakness.      Gait: Gait normal.         ASSESSMENT/PLAN:  Agustin was seen today for migraine.    Diagnoses and all orders for this  visit:    Other migraine without status migrainosus, not intractable  -     naproxen (NAPROSYN) 500 MG tablet; Take 1 tablet (500 mg total) by mouth 2 (two) times daily with meals.  -     rizatriptan (MAXALT-MLT) 10 MG disintegrating tablet; Take 1 tablet (10 mg total) by mouth as needed for Migraine. May repeat in 2 hours if needed       HO- Headache    Handout provided  Follow instructions listed on hand out for treatment  Call or return to clinic if worsens or does not resolve      No results found for this or any previous visit (from the past 672 hour(s)).      Follow Up:  No follow-ups on file.      Maxalt today-  Naprosyn 500 mg twice a day x 3- 5 days.    RTC  if no better or worsens

## 2023-07-20 ENCOUNTER — TELEPHONE (OUTPATIENT)
Dept: PEDIATRICS | Facility: CLINIC | Age: 23
End: 2023-07-20
Payer: COMMERCIAL

## 2023-07-20 NOTE — TELEPHONE ENCOUNTER
----- Message from Ze Lewis II, MD sent at 7/18/2023 11:50 AM CDT -----  Status check- Wednesday-daily til better

## 2023-08-15 ENCOUNTER — OFFICE VISIT (OUTPATIENT)
Dept: PEDIATRICS | Facility: CLINIC | Age: 23
End: 2023-08-15
Payer: COMMERCIAL

## 2023-08-15 VITALS
BODY MASS INDEX: 32.99 KG/M2 | HEART RATE: 76 BPM | WEIGHT: 210.19 LBS | TEMPERATURE: 98 F | DIASTOLIC BLOOD PRESSURE: 84 MMHG | HEIGHT: 67 IN | SYSTOLIC BLOOD PRESSURE: 130 MMHG

## 2023-08-15 DIAGNOSIS — G43.809 OTHER MIGRAINE WITHOUT STATUS MIGRAINOSUS, NOT INTRACTABLE: ICD-10-CM

## 2023-08-15 DIAGNOSIS — F41.9 ANXIETY: ICD-10-CM

## 2023-08-15 DIAGNOSIS — F90.2 ADHD (ATTENTION DEFICIT HYPERACTIVITY DISORDER), COMBINED TYPE: Primary | ICD-10-CM

## 2023-08-15 PROCEDURE — 3079F DIAST BP 80-89 MM HG: CPT | Mod: CPTII,S$GLB,, | Performed by: PEDIATRICS

## 2023-08-15 PROCEDURE — 1159F MED LIST DOCD IN RCRD: CPT | Mod: CPTII,S$GLB,, | Performed by: PEDIATRICS

## 2023-08-15 PROCEDURE — 99214 PR OFFICE/OUTPT VISIT, EST, LEVL IV, 30-39 MIN: ICD-10-PCS | Mod: S$GLB,,, | Performed by: PEDIATRICS

## 2023-08-15 PROCEDURE — 3008F PR BODY MASS INDEX (BMI) DOCUMENTED: ICD-10-PCS | Mod: CPTII,S$GLB,, | Performed by: PEDIATRICS

## 2023-08-15 PROCEDURE — 3075F SYST BP GE 130 - 139MM HG: CPT | Mod: CPTII,S$GLB,, | Performed by: PEDIATRICS

## 2023-08-15 PROCEDURE — 3075F PR MOST RECENT SYSTOLIC BLOOD PRESS GE 130-139MM HG: ICD-10-PCS | Mod: CPTII,S$GLB,, | Performed by: PEDIATRICS

## 2023-08-15 PROCEDURE — 3079F PR MOST RECENT DIASTOLIC BLOOD PRESSURE 80-89 MM HG: ICD-10-PCS | Mod: CPTII,S$GLB,, | Performed by: PEDIATRICS

## 2023-08-15 PROCEDURE — 99214 OFFICE O/P EST MOD 30 MIN: CPT | Mod: S$GLB,,, | Performed by: PEDIATRICS

## 2023-08-15 PROCEDURE — 1159F PR MEDICATION LIST DOCUMENTED IN MEDICAL RECORD: ICD-10-PCS | Mod: CPTII,S$GLB,, | Performed by: PEDIATRICS

## 2023-08-15 PROCEDURE — 99999 PR PBB SHADOW E&M-EST. PATIENT-LVL IV: ICD-10-PCS | Mod: PBBFAC,,, | Performed by: PEDIATRICS

## 2023-08-15 PROCEDURE — 3008F BODY MASS INDEX DOCD: CPT | Mod: CPTII,S$GLB,, | Performed by: PEDIATRICS

## 2023-08-15 PROCEDURE — 99999 PR PBB SHADOW E&M-EST. PATIENT-LVL IV: CPT | Mod: PBBFAC,,, | Performed by: PEDIATRICS

## 2023-08-15 RX ORDER — FLUOXETINE HYDROCHLORIDE 40 MG/1
40 CAPSULE ORAL DAILY
Qty: 90 CAPSULE | Refills: 0 | Status: SHIPPED | OUTPATIENT
Start: 2023-08-15 | End: 2023-11-13

## 2023-08-15 RX ORDER — DEXMETHYLPHENIDATE HYDROCHLORIDE 40 MG/1
40 CAPSULE, EXTENDED RELEASE ORAL EVERY MORNING
Qty: 30 CAPSULE | Refills: 0 | Status: SHIPPED | OUTPATIENT
Start: 2023-09-14 | End: 2023-10-14

## 2023-08-15 RX ORDER — DEXMETHYLPHENIDATE HYDROCHLORIDE 40 MG/1
40 CAPSULE, EXTENDED RELEASE ORAL EVERY MORNING
Qty: 30 CAPSULE | Refills: 0 | Status: SHIPPED | OUTPATIENT
Start: 2023-10-14 | End: 2023-11-13

## 2023-08-15 RX ORDER — DEXMETHYLPHENIDATE HYDROCHLORIDE 40 MG/1
40 CAPSULE, EXTENDED RELEASE ORAL EVERY MORNING
Qty: 30 CAPSULE | Refills: 0 | Status: SHIPPED | OUTPATIENT
Start: 2023-08-15 | End: 2023-09-14

## 2023-08-15 NOTE — PROGRESS NOTES
Subjective:   HPI:  Agustin Minor is a 23 y.o. patient here for follow up ADHD visit,  alone, who is a historian    his  ADHD symptoms have remained the same since last visit.    Attentive in afternoon (with homework): yes    Side effects of Medicine:    Trouble Sleeping; Appetite Changes; Jitteriness; Irritability or moodiness; Headaches or Stomach Aches; Other? Friends not that he is more irritable than normal, due to hyper-focus all the time.  Wants to continue medicine.    Level/Grade in School:Senior majoring in Biochemistry and Chemical Engineering with a minor in [place]  Performance: 2.9 GPA- Graduate 12/24    Accommodations? No    Current ADHD Medication/Dose in am:Focalin XR 40 mg   Afternoon medicine?   Dose- none   Taking daily? Yes    Concerns? No    Headache- previous resolved within 2 days.           Anxiety symptoms have improved since last visit.    Counselor, if seeing: none    Patient feels controlled on medication: yes  Panic attacks: 1 recently, one week in [facility] and had difficult week.  About a week or two ago.    Current Anxiety Medication/Dose: Prozac 40 mg  Any side effects of medication: dizziness, drowsiness, dry mouth, appetite changes?  yes      Agustin's allergies, medications, history, and problem list were updated as appropriate.    Review of patient's allergies indicates:   Allergen Reactions    Sulfa (sulfonamide antibiotics) Swelling       Patient Active Problem List   Diagnosis    Vasovagal syncope    ADHD (attention deficit hyperactivity disorder), combined type    Anxiety    Allergic rhinitis    Asthma         Review of Systems  A comprehensive review of symptoms was completed and negative except as noted in the HPI.      Objective:     Vitals:    08/15/23 0856 08/15/23 0910   BP: (!) 147/82 130/84   BP Location: Right arm Left arm   Patient Position: Sitting Sitting   BP Method: Large (Automatic) Large (Manual)   Pulse: 76    Temp: 98 °F (36.7 °C)    TempSrc: Oral   "  Weight: 95.3 kg (210 lb 3.2 oz)    Height: 5' 7" (1.702 m)        Last 3 Weights:   Wt Readings from Last 3 Encounters:   08/15/23 0856 95.3 kg (210 lb 3.2 oz)   07/18/23 1136 97.8 kg (215 lb 9.6 oz)   06/05/23 1127 97.8 kg (215 lb 9.6 oz)         Physical Exam  Vitals reviewed.   Constitutional:       Appearance: Normal appearance.   HENT:      Right Ear: Tympanic membrane normal.      Left Ear: Tympanic membrane normal.      Nose: Nose normal.      Mouth/Throat:      Pharynx: Oropharynx is clear.   Eyes:      Conjunctiva/sclera: Conjunctivae normal.   Cardiovascular:      Rate and Rhythm: Normal rate and regular rhythm.      Heart sounds: Normal heart sounds. No murmur heard.     No friction rub. No gallop.   Pulmonary:      Breath sounds: Normal breath sounds.   Abdominal:      Palpations: Abdomen is soft.      Tenderness: There is no abdominal tenderness.   Musculoskeletal:         General: Normal range of motion.      Cervical back: Neck supple.   Skin:     Findings: No rash.   Neurological:      General: No focal deficit present.           Assessment/Plan:        ADHD (attention deficit hyperactivity disorder), combined type  -     dexmethylphenidate (FOCALIN XR) 40 mg 24 hr capsule; Take 40 mg by mouth every morning.  Dispense: 30 capsule; Refill: 0  -     dexmethylphenidate (FOCALIN XR) 40 mg 24 hr capsule; Take 40 mg by mouth every morning.  Dispense: 30 capsule; Refill: 0  -     dexmethylphenidate (FOCALIN XR) 40 mg 24 hr capsule; Take 40 mg by mouth every morning.  Dispense: 30 capsule; Refill: 0    Anxiety  -     FLUoxetine 40 MG capsule; Take 1 capsule (40 mg total) by mouth once daily.  Dispense: 90 capsule; Refill: 0    Other migraine without status migrainosus, not intractable            Outpatient Encounter Medications as of 8/15/2023   Medication Sig Dispense Refill    dexmethylphenidate (FOCALIN XR) 30 mg 24 hr capsule Take 1 capsule by mouth once daily. 30 capsule 0    FLUoxetine 40 MG capsule " Take 40 mg by mouth once daily.      levocetirizine (XYZAL) 5 MG tablet Take 5 mg by mouth.      mometasone (ASMANEX TWISTHALER) 220 mcg/ actuation (120) AePB Inhale into the lungs. Controller      SYMBICORT 160-4.5 mcg/actuation HFAA       albuterol (PROVENTIL/VENTOLIN HFA) 90 mcg/actuation inhaler       albuterol (PROVENTIL/VENTOLIN HFA) 90 mcg/actuation inhaler Rescue (Patient not taking: Reported on 6/5/2023) 18 g 0    ALPRAZolam (XANAX) 0.5 MG tablet Take 1 tablet (0.5 mg total) by mouth as needed for Anxiety. Take 0.25 one hour before appointment 3 tablet 0    amoxicillin-clavulanate 500-125mg (AUGMENTIN) 500-125 mg Tab Take 1 tablet by mouth 2 (two) times daily.      azelastine (ASTELIN) 137 mcg (0.1 %) nasal spray 1 spray 2 (two) times daily.      beclomethasone dipropionate 40 mcg/actuation HFAB Inhale 2 puffs into the lungs 2 (two) times daily. (Patient not taking: Reported on 6/5/2023) 10.6 g 0    dexmethylphenidate (FOCALIN XR) 40 mg 24 hr capsule Take 40 mg by mouth every morning. (Patient not taking: Reported on 6/5/2023) 30 capsule 0    dexmethylphenidate (FOCALIN XR) 40 mg 24 hr capsule Take 40 mg by mouth every morning. 30 capsule 0    [START ON 9/14/2023] dexmethylphenidate (FOCALIN XR) 40 mg 24 hr capsule Take 40 mg by mouth every morning. 30 capsule 0    [START ON 10/14/2023] dexmethylphenidate (FOCALIN XR) 40 mg 24 hr capsule Take 40 mg by mouth every morning. 30 capsule 0    dextroamphetamine-amphetamine (ADDERALL XR) 20 MG 24 hr capsule Take 2 capsules (40 mg total) by mouth every morning. 60 capsule 0    esomeprazole (NEXIUM) 40 MG capsule Take 1 capsule (40 mg total) by mouth once daily. (Patient not taking: Reported on 2/13/2023) 30 capsule 1    FLUoxetine 40 MG capsule Take 1 capsule (40 mg total) by mouth once daily. (Patient not taking: Reported on 6/5/2023) 90 capsule 0    FLUoxetine 40 MG capsule Take 1 capsule (40 mg total) by mouth once daily. 90 capsule 0    hydrocortisone 2.5 %  ointment APPLY EXTERNALLY TO THE AFFECTED AREA ON NECK TWICE DAILY AS NEEDED      mometasone (ASMANEX TWISTHALER) 110 mcg/ actuation (30) AePB Inhale 1 puff into the lungs 2 (two) times daily. Controller (Patient not taking: Reported on 7/18/2023) 1 each 0    naproxen (NAPROSYN) 500 MG tablet Take 1 tablet (500 mg total) by mouth 2 (two) times daily with meals. (Patient not taking: Reported on 8/15/2023) 60 tablet 1    ondansetron (ZOFRAN-ODT) 8 MG TbDL Take 1 tablet (8 mg total) by mouth every 6 (six) hours as needed (nausea). (Patient not taking: Reported on 5/15/2023) 8 tablet 0    phenylephrine-DM-guaiFENesin (AQUANAZ) 10- mg Tab Take 1 tablet by mouth every 4 to 6 hours as needed (for congestion/cough). (Patient not taking: Reported on 2/13/2023) 30 tablet 0    predniSONE (DELTASONE) 10 MG tablet Take by mouth.      rizatriptan (MAXALT-MLT) 10 MG disintegrating tablet Take 1 tablet (10 mg total) by mouth as needed for Migraine. May repeat in 2 hours if needed (Patient not taking: Reported on 8/15/2023) 6 tablet 0    triamcinolone acetonide 0.1% (KENALOG) 0.1 % paste APPLY TO ULCER THREE TIMES DAILY AS NEEDED       No facility-administered encounter medications on file as of 8/15/2023.         Next scheduled follow-up appointment for ADD/ADHD management will be in 3 months.  If changes are made to medications, then will need to follow up in three to four weeks.    We discussed the management goals for patients with ADHD:  1. Adequate Control of Focus and/or Behavior.  2. Tolerable Medication Side-Effects (Including some Loss of Appetite).  Need to maintain weight.  3. Function well in life, school and/or work.

## 2023-09-27 ENCOUNTER — OFFICE VISIT (OUTPATIENT)
Dept: PEDIATRICS | Facility: CLINIC | Age: 23
End: 2023-09-27
Payer: COMMERCIAL

## 2023-09-27 VITALS
BODY MASS INDEX: 32.08 KG/M2 | WEIGHT: 204.38 LBS | HEART RATE: 88 BPM | SYSTOLIC BLOOD PRESSURE: 151 MMHG | DIASTOLIC BLOOD PRESSURE: 80 MMHG | TEMPERATURE: 98 F | HEIGHT: 67 IN

## 2023-09-27 DIAGNOSIS — F90.9 ATTENTION DEFICIT HYPERACTIVITY DISORDER (ADHD), UNSPECIFIED ADHD TYPE: Primary | ICD-10-CM

## 2023-09-27 DIAGNOSIS — J02.9 PHARYNGITIS, UNSPECIFIED ETIOLOGY: ICD-10-CM

## 2023-09-27 PROCEDURE — 1160F RVW MEDS BY RX/DR IN RCRD: CPT | Mod: CPTII,S$GLB,, | Performed by: PEDIATRICS

## 2023-09-27 PROCEDURE — 3079F PR MOST RECENT DIASTOLIC BLOOD PRESSURE 80-89 MM HG: ICD-10-PCS | Mod: CPTII,S$GLB,, | Performed by: PEDIATRICS

## 2023-09-27 PROCEDURE — 1159F PR MEDICATION LIST DOCUMENTED IN MEDICAL RECORD: ICD-10-PCS | Mod: CPTII,S$GLB,, | Performed by: PEDIATRICS

## 2023-09-27 PROCEDURE — 99999 PR PBB SHADOW E&M-EST. PATIENT-LVL IV: ICD-10-PCS | Mod: PBBFAC,,, | Performed by: PEDIATRICS

## 2023-09-27 PROCEDURE — 3079F DIAST BP 80-89 MM HG: CPT | Mod: CPTII,S$GLB,, | Performed by: PEDIATRICS

## 2023-09-27 PROCEDURE — 99999 PR PBB SHADOW E&M-EST. PATIENT-LVL IV: CPT | Mod: PBBFAC,,, | Performed by: PEDIATRICS

## 2023-09-27 PROCEDURE — 3077F SYST BP >= 140 MM HG: CPT | Mod: CPTII,S$GLB,, | Performed by: PEDIATRICS

## 2023-09-27 PROCEDURE — 99214 OFFICE O/P EST MOD 30 MIN: CPT | Mod: S$GLB,,, | Performed by: PEDIATRICS

## 2023-09-27 PROCEDURE — 1160F PR REVIEW ALL MEDS BY PRESCRIBER/CLIN PHARMACIST DOCUMENTED: ICD-10-PCS | Mod: CPTII,S$GLB,, | Performed by: PEDIATRICS

## 2023-09-27 PROCEDURE — 99214 PR OFFICE/OUTPT VISIT, EST, LEVL IV, 30-39 MIN: ICD-10-PCS | Mod: S$GLB,,, | Performed by: PEDIATRICS

## 2023-09-27 PROCEDURE — 1159F MED LIST DOCD IN RCRD: CPT | Mod: CPTII,S$GLB,, | Performed by: PEDIATRICS

## 2023-09-27 PROCEDURE — 3008F BODY MASS INDEX DOCD: CPT | Mod: CPTII,S$GLB,, | Performed by: PEDIATRICS

## 2023-09-27 PROCEDURE — 3008F PR BODY MASS INDEX (BMI) DOCUMENTED: ICD-10-PCS | Mod: CPTII,S$GLB,, | Performed by: PEDIATRICS

## 2023-09-27 PROCEDURE — 3077F PR MOST RECENT SYSTOLIC BLOOD PRESSURE >= 140 MM HG: ICD-10-PCS | Mod: CPTII,S$GLB,, | Performed by: PEDIATRICS

## 2023-09-27 RX ORDER — DEXMETHYLPHENIDATE HYDROCHLORIDE 10 MG/1
TABLET ORAL
Qty: 30 TABLET | Refills: 0 | Status: SHIPPED | OUTPATIENT
Start: 2023-09-27 | End: 2024-03-05

## 2023-09-27 RX ORDER — CEPHALEXIN 500 MG/1
500 CAPSULE ORAL EVERY 12 HOURS
Qty: 20 CAPSULE | Refills: 0 | Status: SHIPPED | OUTPATIENT
Start: 2023-09-27 | End: 2023-10-07

## 2023-09-27 RX ORDER — DEXMETHYLPHENIDATE HYDROCHLORIDE 40 MG/1
40 CAPSULE, EXTENDED RELEASE ORAL EVERY MORNING
Qty: 30 CAPSULE | Refills: 0 | Status: SHIPPED | OUTPATIENT
Start: 2023-09-27

## 2023-09-27 NOTE — PROGRESS NOTES
"SUBJECTIVE:  Agustin Minor is a 23 y.o. male here accompanied by self  for a medication recheck.  Also complaining of sore throat or "tonsil stone" that has hurt him for 6 days.     HPI  Current medication and dose: dexmethylphenidate (FOCALIN XR) 40 mg 24 hr capsule once daily in am   Taking daily? Yes  School/grade: [employer] Senior majoring in Biochemistry and Chemical Engineering with a minor in Irish  Current performance: good  Accommodations? No  Concerns? Yes, pt would like a different medication. Focalin is only lasting an hr.     Agustin's allergies, medications, history, and problem list were updated as appropriate.    Review of Systems  A comprehensive review of symptoms was completed and negative except as noted in the HPI.    OBJECTIVE:  Vital signs  Vitals:    09/27/23 0956   BP: (!) 173/85   Pulse: 84   Temp: 97.6 °F (36.4 °C)   TempSrc: Oral   Weight: 92.7 kg (204 lb 6.4 oz)   Height: 5' 6.5" (1.689 m)        Physical Exam  Vitals reviewed.   Constitutional:       General: He is not in acute distress.  HENT:      Right Ear: Tympanic membrane normal.      Left Ear: Tympanic membrane normal.      Nose: Nose normal.      Mouth/Throat:      Pharynx: Oropharynx is clear.      Comments: No exudate or stone appreciated today on exam.    Cardiovascular:      Rate and Rhythm: Normal rate and regular rhythm.      Heart sounds: Normal heart sounds.   Pulmonary:      Breath sounds: Normal breath sounds.   Skin:     Findings: No rash.            ASSESSMENT/PLAN:  Diagnoses and all orders for this visit:    Attention deficit hyperactivity disorder (ADHD), unspecified ADHD type  -     dexmethylphenidate (FOCALIN XR) 40 mg 24 hr capsule; Take 40 mg by mouth every morning.  -     dexmethylphenidate (FOCALIN) 10 MG tablet; Take one tablet daily in the afternoon or evening as needed for homework or studying.    Pharyngitis, unspecified etiology  -     cephALEXin (KEFLEX) 500 MG capsule; Take 1 capsule (500 mg total) by " mouth every 12 (twelve) hours. for 10 days     Daily antihistamine.  Warm salt water gargles.     Follow Up:  3 months for medication recheck.

## 2023-10-13 RX ORDER — ALBUTEROL SULFATE 90 UG/1
2-4 AEROSOL, METERED RESPIRATORY (INHALATION) EVERY 4 HOURS PRN
Qty: 18 G | Refills: 0 | Status: SHIPPED | OUTPATIENT
Start: 2023-10-13

## 2023-10-13 NOTE — TELEPHONE ENCOUNTER
Seen 9/27. Escribed below requested.        ----- Message from Sami Hawkins MA sent at 10/13/2023  8:02 AM CDT -----  Regarding: Refill  Refill on albuterol inhaler 90 mg.

## 2023-11-02 ENCOUNTER — TELEPHONE (OUTPATIENT)
Dept: PEDIATRICS | Facility: CLINIC | Age: 23
End: 2023-11-02
Payer: COMMERCIAL

## 2023-11-02 DIAGNOSIS — F90.2 ADHD (ATTENTION DEFICIT HYPERACTIVITY DISORDER), COMBINED TYPE: Primary | ICD-10-CM

## 2023-11-02 RX ORDER — DEXTROAMPHETAMINE SACCHARATE, AMPHETAMINE ASPARTATE, DEXTROAMPHETAMINE SULFATE AND AMPHETAMINE SULFATE 7.5; 7.5; 7.5; 7.5 MG/1; MG/1; MG/1; MG/1
1 TABLET ORAL DAILY
Qty: 30 TABLET | Refills: 0 | Status: SHIPPED | OUTPATIENT
Start: 2023-11-02 | End: 2024-03-05

## 2023-11-02 RX ORDER — DEXTROAMPHETAMINE SACCHARATE, AMPHETAMINE ASPARTATE, DEXTROAMPHETAMINE SULFATE AND AMPHETAMINE SULFATE 7.5; 7.5; 7.5; 7.5 MG/1; MG/1; MG/1; MG/1
1 TABLET ORAL DAILY
COMMUNITY
End: 2023-11-02 | Stop reason: SDUPTHER

## 2023-11-02 RX ORDER — DEXTROAMPHETAMINE SACCHARATE, AMPHETAMINE ASPARTATE MONOHYDRATE, DEXTROAMPHETAMINE SULFATE AND AMPHETAMINE SULFATE 6.25; 6.25; 6.25; 6.25 MG/1; MG/1; MG/1; MG/1
25 CAPSULE, EXTENDED RELEASE ORAL EVERY MORNING
Qty: 30 CAPSULE | Refills: 0 | Status: SHIPPED | OUTPATIENT
Start: 2023-11-02 | End: 2024-03-05

## 2023-11-02 RX ORDER — DEXTROAMPHETAMINE SACCHARATE, AMPHETAMINE ASPARTATE MONOHYDRATE, DEXTROAMPHETAMINE SULFATE AND AMPHETAMINE SULFATE 6.25; 6.25; 6.25; 6.25 MG/1; MG/1; MG/1; MG/1
25 CAPSULE, EXTENDED RELEASE ORAL EVERY MORNING
COMMUNITY
End: 2023-11-02 | Stop reason: SDUPTHER

## 2023-11-02 NOTE — TELEPHONE ENCOUNTER
Notified can send Adderall XR since he's taken it in the past. Confirm with pharm that it's in stock. Will send that with homework dose. Pt agrees.        ----- Message from Freda Fitzgerald MA sent at 11/2/2023 12:34 PM CDT -----  Contact: Agustin Minor  Pt is currently taking Focalin XR 40 mg and 10 mg but it is on back order for 6-7 weeks now, so the pharmacy told him he should ask about a prescription change so he wanted to know if that was possible.      #[phone]

## 2023-12-04 ENCOUNTER — TELEPHONE (OUTPATIENT)
Dept: PEDIATRICS | Facility: CLINIC | Age: 23
End: 2023-12-04
Payer: COMMERCIAL

## 2023-12-04 NOTE — TELEPHONE ENCOUNTER
Left VM to reschedule Appt on Dec 27. Canceling appointment due and will reschedule when patient returns call.

## 2023-12-12 ENCOUNTER — OFFICE VISIT (OUTPATIENT)
Dept: PEDIATRICS | Facility: CLINIC | Age: 23
End: 2023-12-12
Payer: COMMERCIAL

## 2023-12-12 VITALS
HEIGHT: 67 IN | HEART RATE: 77 BPM | TEMPERATURE: 98 F | BODY MASS INDEX: 31.58 KG/M2 | SYSTOLIC BLOOD PRESSURE: 121 MMHG | DIASTOLIC BLOOD PRESSURE: 68 MMHG | WEIGHT: 201.19 LBS

## 2023-12-12 DIAGNOSIS — F90.2 ADHD (ATTENTION DEFICIT HYPERACTIVITY DISORDER), COMBINED TYPE: Primary | ICD-10-CM

## 2023-12-12 DIAGNOSIS — F41.9 ANXIETY: ICD-10-CM

## 2023-12-12 PROCEDURE — 3078F PR MOST RECENT DIASTOLIC BLOOD PRESSURE < 80 MM HG: ICD-10-PCS | Mod: CPTII,S$GLB,, | Performed by: PEDIATRICS

## 2023-12-12 PROCEDURE — 1160F RVW MEDS BY RX/DR IN RCRD: CPT | Mod: CPTII,S$GLB,, | Performed by: PEDIATRICS

## 2023-12-12 PROCEDURE — 99214 OFFICE O/P EST MOD 30 MIN: CPT | Mod: S$GLB,,, | Performed by: PEDIATRICS

## 2023-12-12 PROCEDURE — 3008F BODY MASS INDEX DOCD: CPT | Mod: CPTII,S$GLB,, | Performed by: PEDIATRICS

## 2023-12-12 PROCEDURE — 3074F SYST BP LT 130 MM HG: CPT | Mod: CPTII,S$GLB,, | Performed by: PEDIATRICS

## 2023-12-12 PROCEDURE — 1159F MED LIST DOCD IN RCRD: CPT | Mod: CPTII,S$GLB,, | Performed by: PEDIATRICS

## 2023-12-12 PROCEDURE — 99214 PR OFFICE/OUTPT VISIT, EST, LEVL IV, 30-39 MIN: ICD-10-PCS | Mod: S$GLB,,, | Performed by: PEDIATRICS

## 2023-12-12 PROCEDURE — 3008F PR BODY MASS INDEX (BMI) DOCUMENTED: ICD-10-PCS | Mod: CPTII,S$GLB,, | Performed by: PEDIATRICS

## 2023-12-12 PROCEDURE — 99999 PR PBB SHADOW E&M-EST. PATIENT-LVL IV: CPT | Mod: PBBFAC,,, | Performed by: PEDIATRICS

## 2023-12-12 PROCEDURE — 1160F PR REVIEW ALL MEDS BY PRESCRIBER/CLIN PHARMACIST DOCUMENTED: ICD-10-PCS | Mod: CPTII,S$GLB,, | Performed by: PEDIATRICS

## 2023-12-12 PROCEDURE — 3074F PR MOST RECENT SYSTOLIC BLOOD PRESSURE < 130 MM HG: ICD-10-PCS | Mod: CPTII,S$GLB,, | Performed by: PEDIATRICS

## 2023-12-12 PROCEDURE — 1159F PR MEDICATION LIST DOCUMENTED IN MEDICAL RECORD: ICD-10-PCS | Mod: CPTII,S$GLB,, | Performed by: PEDIATRICS

## 2023-12-12 PROCEDURE — 99999 PR PBB SHADOW E&M-EST. PATIENT-LVL IV: ICD-10-PCS | Mod: PBBFAC,,, | Performed by: PEDIATRICS

## 2023-12-12 PROCEDURE — 3078F DIAST BP <80 MM HG: CPT | Mod: CPTII,S$GLB,, | Performed by: PEDIATRICS

## 2023-12-12 RX ORDER — DEXMETHYLPHENIDATE HYDROCHLORIDE 10 MG/1
10 TABLET ORAL DAILY
Qty: 30 TABLET | Refills: 0 | Status: SHIPPED | OUTPATIENT
Start: 2024-01-11 | End: 2024-02-10

## 2023-12-12 RX ORDER — DEXMETHYLPHENIDATE HYDROCHLORIDE 10 MG/1
10 TABLET ORAL DAILY
Qty: 30 TABLET | Refills: 0 | Status: SHIPPED | OUTPATIENT
Start: 2024-02-10 | End: 2024-03-05

## 2023-12-12 RX ORDER — DEXMETHYLPHENIDATE HYDROCHLORIDE 30 MG/1
30 CAPSULE, EXTENDED RELEASE ORAL EVERY MORNING
Qty: 30 CAPSULE | Refills: 0 | Status: SHIPPED | OUTPATIENT
Start: 2024-02-10 | End: 2024-03-11

## 2023-12-12 RX ORDER — DEXMETHYLPHENIDATE HYDROCHLORIDE 30 MG/1
30 CAPSULE, EXTENDED RELEASE ORAL EVERY MORNING
Qty: 30 CAPSULE | Refills: 0 | Status: SHIPPED | OUTPATIENT
Start: 2024-01-11 | End: 2024-02-10

## 2023-12-12 RX ORDER — DEXMETHYLPHENIDATE HYDROCHLORIDE 10 MG/1
10 TABLET ORAL DAILY
Qty: 30 TABLET | Refills: 0 | Status: SHIPPED | OUTPATIENT
Start: 2023-12-12 | End: 2024-01-11

## 2023-12-12 RX ORDER — DEXMETHYLPHENIDATE HYDROCHLORIDE 30 MG/1
30 CAPSULE, EXTENDED RELEASE ORAL EVERY MORNING
Qty: 30 CAPSULE | Refills: 0 | Status: SHIPPED | OUTPATIENT
Start: 2023-12-12 | End: 2024-01-11

## 2023-12-12 RX ORDER — FLUOXETINE HYDROCHLORIDE 40 MG/1
40 CAPSULE ORAL DAILY
Qty: 90 CAPSULE | Refills: 0 | Status: SHIPPED | OUTPATIENT
Start: 2023-12-12 | End: 2024-03-11

## 2023-12-12 NOTE — PROGRESS NOTES
"    Subjective:   HPI:  Agustin Minor is a 23 y.o. patient here for follow up ADHD visit,  alone, who is a historian    his  ADHD symptoms have improved since last visit.    Attentive in afternoon (with homework): Yes    Side effects of Medicine:    Trouble Sleeping; Appetite Changes; Jitteriness; Irritability or moodiness; Headaches or Stomach Aches; Other? no    Level/Grade in School:[employer], Majoring in Chemical Engineering and Biochemistry   Performance: GPA: 3.1    Accommodations? No    Current ADHD Medication/Dose in am: Focalin XR 30 mg and at 2 pm- 10 mg   Afternoon medicine?   Dose- N/A   Taking daily? Yes    Concerns? no    Last RHS:  5/15/2023              Anxiety symptoms have stayed the same since last visit.    Counselor, if seeing: No    Patient feels controlled on medication: yes  Panic attacks: yes    Current Anxiety Medication/Dose: Prozac 40 mg   Any side effects of medication: dizziness, drowsiness, dry mouth, appetite changes?  no     Agustin's allergies, medications, history, and problem list were updated as appropriate.    Review of patient's allergies indicates:   Allergen Reactions    Sulfa (sulfonamide antibiotics) Swelling       Patient Active Problem List   Diagnosis    Vasovagal syncope    ADHD (attention deficit hyperactivity disorder), combined type    Anxiety    Allergic rhinitis    Asthma         Review of Systems  A comprehensive review of symptoms was completed and negative except as noted in the HPI.      Objective:     Vitals:    12/12/23 0837   BP: 121/68   BP Location: Left arm   Patient Position: Sitting   BP Method: Large (Automatic)   Pulse: 77   Temp: 98 °F (36.7 °C)   TempSrc: Oral   Weight: 91.3 kg (201 lb 3.2 oz)   Height: 5' 7" (1.702 m)       Last 3 Weights:   Wt Readings from Last 3 Encounters:   12/12/23 0837 91.3 kg (201 lb 3.2 oz)   09/27/23 0956 92.7 kg (204 lb 6.4 oz)   08/15/23 0856 95.3 kg (210 lb 3.2 oz)         Physical Exam  Vitals reviewed. "   Constitutional:       Appearance: Normal appearance.   HENT:      Right Ear: Tympanic membrane normal.      Left Ear: Tympanic membrane normal.      Nose: Nose normal.      Mouth/Throat:      Pharynx: Oropharynx is clear.   Eyes:      Conjunctiva/sclera: Conjunctivae normal.   Cardiovascular:      Rate and Rhythm: Normal rate and regular rhythm.      Heart sounds: Normal heart sounds. No murmur heard.     No friction rub. No gallop.   Pulmonary:      Breath sounds: Normal breath sounds.   Abdominal:      Palpations: Abdomen is soft.      Tenderness: There is no abdominal tenderness.   Musculoskeletal:         General: Normal range of motion.      Cervical back: Neck supple.   Skin:     Findings: No rash.   Neurological:      General: No focal deficit present.           Assessment/Plan:        ADHD (attention deficit hyperactivity disorder), combined type  -     dexmethylphenidate (FOCALIN XR) 30 mg 24 hr capsule; Take 30 mg by mouth every morning.  Dispense: 30 capsule; Refill: 0  -     dexmethylphenidate (FOCALIN XR) 30 mg 24 hr capsule; Take 30 mg by mouth every morning.  Dispense: 30 capsule; Refill: 0  -     dexmethylphenidate (FOCALIN XR) 30 mg 24 hr capsule; Take 30 mg by mouth every morning.  Dispense: 30 capsule; Refill: 0  -     dexmethylphenidate (FOCALIN) 10 MG tablet; Take 1 tablet (10 mg total) by mouth once daily.  Dispense: 30 tablet; Refill: 0  -     dexmethylphenidate (FOCALIN) 10 MG tablet; Take 1 tablet (10 mg total) by mouth once daily.  Dispense: 30 tablet; Refill: 0  -     dexmethylphenidate (FOCALIN) 10 MG tablet; Take 1 tablet (10 mg total) by mouth once daily.  Dispense: 30 tablet; Refill: 0    Anxiety  -     FLUoxetine 40 MG capsule; Take 1 capsule (40 mg total) by mouth once daily.  Dispense: 90 capsule; Refill: 0            Outpatient Encounter Medications as of 12/12/2023   Medication Sig Dispense Refill    dexmethylphenidate (FOCALIN XR) 40 mg 24 hr capsule Take 40 mg by mouth every  morning. 30 capsule 0    FLUoxetine 40 MG capsule Take 40 mg by mouth once daily.      albuterol (PROVENTIL/VENTOLIN HFA) 90 mcg/actuation inhaler Rescue (Patient not taking: Reported on 12/12/2023) 18 g 0    albuterol (PROVENTIL/VENTOLIN HFA) 90 mcg/actuation inhaler Inhale 2-4 puffs into the lungs every 4 (four) hours as needed for Wheezing or Shortness of Breath. (Patient not taking: Reported on 12/12/2023) 18 g 0    ALPRAZolam (XANAX) 0.5 MG tablet Take 1 tablet (0.5 mg total) by mouth as needed for Anxiety. Take 0.25 one hour before appointment 3 tablet 0    amoxicillin-clavulanate 500-125mg (AUGMENTIN) 500-125 mg Tab Take 1 tablet by mouth 2 (two) times daily.      azelastine (ASTELIN) 137 mcg (0.1 %) nasal spray 1 spray 2 (two) times daily.      beclomethasone dipropionate 40 mcg/actuation HFAB Inhale 2 puffs into the lungs 2 (two) times daily. (Patient not taking: Reported on 12/12/2023) 10.6 g 0    dexmethylphenidate (FOCALIN XR) 30 mg 24 hr capsule Take 1 capsule by mouth once daily. (Patient not taking: Reported on 12/12/2023) 30 capsule 0    dexmethylphenidate (FOCALIN XR) 30 mg 24 hr capsule Take 30 mg by mouth every morning. 30 capsule 0    [START ON 1/11/2024] dexmethylphenidate (FOCALIN XR) 30 mg 24 hr capsule Take 30 mg by mouth every morning. 30 capsule 0    [START ON 2/10/2024] dexmethylphenidate (FOCALIN XR) 30 mg 24 hr capsule Take 30 mg by mouth every morning. 30 capsule 0    dexmethylphenidate (FOCALIN) 10 MG tablet Take one tablet daily in the afternoon or evening as needed for homework or studying. (Patient not taking: Reported on 12/12/2023) 30 tablet 0    dexmethylphenidate (FOCALIN) 10 MG tablet Take 1 tablet (10 mg total) by mouth once daily. 30 tablet 0    [START ON 1/11/2024] dexmethylphenidate (FOCALIN) 10 MG tablet Take 1 tablet (10 mg total) by mouth once daily. 30 tablet 0    [START ON 2/10/2024] dexmethylphenidate (FOCALIN) 10 MG tablet Take 1 tablet (10 mg total) by mouth once  daily. 30 tablet 0    dextroamphetamine-amphetamine (ADDERALL XR) 25 MG 24 hr capsule Take 1 capsule (25 mg total) by mouth every morning. (Patient not taking: Reported on 12/12/2023) 30 capsule 0    dextroamphetamine-amphetamine 30 mg Tab Take 1 tablet (30 mg total) by mouth once daily. (Patient not taking: Reported on 12/12/2023) 30 tablet 0    esomeprazole (NEXIUM) 40 MG capsule Take 1 capsule (40 mg total) by mouth once daily. (Patient not taking: Reported on 2/13/2023) 30 capsule 1    FLUoxetine 40 MG capsule Take 1 capsule (40 mg total) by mouth once daily. 90 capsule 0    hydrocortisone 2.5 % ointment APPLY EXTERNALLY TO THE AFFECTED AREA ON NECK TWICE DAILY AS NEEDED      levocetirizine (XYZAL) 5 MG tablet Take 5 mg by mouth.      mometasone (ASMANEX TWISTHALER) 110 mcg/ actuation (30) AePB Inhale 1 puff into the lungs 2 (two) times daily. Controller (Patient not taking: Reported on 12/12/2023) 1 each 0    mometasone (ASMANEX TWISTHALER) 220 mcg/ actuation (120) AePB Inhale into the lungs. Controller      naproxen (NAPROSYN) 500 MG tablet Take 1 tablet (500 mg total) by mouth 2 (two) times daily with meals. (Patient not taking: Reported on 12/12/2023) 60 tablet 1    ondansetron (ZOFRAN-ODT) 8 MG TbDL Take 1 tablet (8 mg total) by mouth every 6 (six) hours as needed (nausea). (Patient not taking: Reported on 12/12/2023) 8 tablet 0    phenylephrine-DM-guaiFENesin (AQUANAZ) 10- mg Tab Take 1 tablet by mouth every 4 to 6 hours as needed (for congestion/cough). (Patient not taking: Reported on 12/12/2023) 30 tablet 0    predniSONE (DELTASONE) 10 MG tablet Take by mouth.      rizatriptan (MAXALT-MLT) 10 MG disintegrating tablet Take 1 tablet (10 mg total) by mouth as needed for Migraine. May repeat in 2 hours if needed (Patient not taking: Reported on 8/15/2023) 6 tablet 0    SYMBICORT 160-4.5 mcg/actuation HFAA       triamcinolone acetonide 0.1% (KENALOG) 0.1 % paste APPLY TO ULCER THREE TIMES DAILY AS  NEEDED       No facility-administered encounter medications on file as of 12/12/2023.         Next scheduled follow-up appointment for ADD/ADHD management will be in 3 months.  If changes are made to medications, then will need to follow up in three to four weeks.    We discussed the management goals for patients with ADHD:  1. Adequate Control of Focus and/or Behavior.  2. Tolerable Medication Side-Effects (Including some Loss of Appetite).  Need to maintain weight.  3. Function well in life, school and/or work.

## 2024-01-12 ENCOUNTER — OFFICE VISIT (OUTPATIENT)
Dept: PEDIATRICS | Facility: CLINIC | Age: 24
End: 2024-01-12
Payer: COMMERCIAL

## 2024-01-12 VITALS
TEMPERATURE: 99 F | SYSTOLIC BLOOD PRESSURE: 135 MMHG | WEIGHT: 200 LBS | BODY MASS INDEX: 31.32 KG/M2 | DIASTOLIC BLOOD PRESSURE: 84 MMHG | HEART RATE: 85 BPM

## 2024-01-12 DIAGNOSIS — F90.9 ATTENTION DEFICIT HYPERACTIVITY DISORDER (ADHD), UNSPECIFIED ADHD TYPE: ICD-10-CM

## 2024-01-12 DIAGNOSIS — F41.9 ANXIETY: ICD-10-CM

## 2024-01-12 DIAGNOSIS — Z23 NEEDS FLU SHOT: Primary | ICD-10-CM

## 2024-01-12 PROCEDURE — 99214 OFFICE O/P EST MOD 30 MIN: CPT | Mod: 25,S$GLB,, | Performed by: PEDIATRICS

## 2024-01-12 PROCEDURE — 3079F DIAST BP 80-89 MM HG: CPT | Mod: CPTII,S$GLB,, | Performed by: PEDIATRICS

## 2024-01-12 PROCEDURE — 3008F BODY MASS INDEX DOCD: CPT | Mod: CPTII,S$GLB,, | Performed by: PEDIATRICS

## 2024-01-12 PROCEDURE — 1160F RVW MEDS BY RX/DR IN RCRD: CPT | Mod: CPTII,S$GLB,, | Performed by: PEDIATRICS

## 2024-01-12 PROCEDURE — 3075F SYST BP GE 130 - 139MM HG: CPT | Mod: CPTII,S$GLB,, | Performed by: PEDIATRICS

## 2024-01-12 PROCEDURE — 90471 IMMUNIZATION ADMIN: CPT | Mod: S$GLB,,, | Performed by: PEDIATRICS

## 2024-01-12 PROCEDURE — 90686 IIV4 VACC NO PRSV 0.5 ML IM: CPT | Mod: S$GLB,,, | Performed by: PEDIATRICS

## 2024-01-12 PROCEDURE — 1159F MED LIST DOCD IN RCRD: CPT | Mod: CPTII,S$GLB,, | Performed by: PEDIATRICS

## 2024-01-12 PROCEDURE — 99999 PR PBB SHADOW E&M-EST. PATIENT-LVL IV: CPT | Mod: PBBFAC,,, | Performed by: PEDIATRICS

## 2024-01-12 RX ORDER — DEXMETHYLPHENIDATE HYDROCHLORIDE 10 MG/1
TABLET ORAL
Qty: 60 TABLET | Refills: 0 | Status: SHIPPED | OUTPATIENT
Start: 2024-01-12 | End: 2024-03-05

## 2024-01-12 RX ORDER — DEXMETHYLPHENIDATE HYDROCHLORIDE 30 MG/1
30 CAPSULE, EXTENDED RELEASE ORAL EVERY MORNING
Qty: 30 CAPSULE | Refills: 0 | Status: SHIPPED | OUTPATIENT
Start: 2024-01-12 | End: 2024-02-11

## 2024-01-12 RX ORDER — FLUOXETINE HYDROCHLORIDE 40 MG/1
40 CAPSULE ORAL DAILY
Qty: 90 CAPSULE | Refills: 0 | Status: SHIPPED | OUTPATIENT
Start: 2024-01-12 | End: 2024-02-11

## 2024-01-12 RX ORDER — DEXMETHYLPHENIDATE HYDROCHLORIDE 30 MG/1
30 CAPSULE, EXTENDED RELEASE ORAL EVERY MORNING
Qty: 30 CAPSULE | Refills: 0 | Status: SHIPPED | OUTPATIENT
Start: 2024-02-11 | End: 2024-03-12

## 2024-01-12 RX ORDER — DEXMETHYLPHENIDATE HYDROCHLORIDE 30 MG/1
30 CAPSULE, EXTENDED RELEASE ORAL EVERY MORNING
Qty: 30 CAPSULE | Refills: 0 | Status: SHIPPED | OUTPATIENT
Start: 2024-03-12 | End: 2024-04-11

## 2024-01-12 NOTE — PROGRESS NOTES
SUBJECTIVE:  Agustin Minor is a 23 y.o. male here alone for an ADHD and an anxiety follow up.    HPI  Current medication and dose:  Focalin XR 30 mg, suppose to take Focalin 10 mg in the afternoon but they did not have it. Fluoxetine 40 mg   Taking daily? yes  School/grade: \[employer]\"" Senior majoring in Biochemistry and Chemical Engineering with a minor in Bengali  Current performance: GPA:3.0    Accommodations? no   Concerns? pt would like to raise in the Fluoxetine and get Focalin 20 mg for the afternoon       Agustin's allergies, medications, history, and problem list were updated as appropriate.    Review of Systems  A comprehensive review of symptoms was completed and negative except as noted in the HPI.    OBJECTIVE:  Vital signs  Vitals:    01/12/24 1119   BP: 135/84   BP Location: Left arm   Patient Position: Sitting   BP Method: Large (Automatic)   Pulse: 85   Temp: 98.6 °F (37 °C)   TempSrc: Oral   Weight: 90.7 kg (200 lb)        Physical Exam  Vitals reviewed.   Constitutional:       General: He is not in acute distress.  HENT:      Right Ear: Tympanic membrane normal.      Left Ear: Tympanic membrane normal.      Nose: Nose normal.      Mouth/Throat:      Pharynx: Oropharynx is clear.   Cardiovascular:      Rate and Rhythm: Normal rate and regular rhythm.      Heart sounds: Normal heart sounds.   Pulmonary:      Breath sounds: Normal breath sounds.   Skin:     Findings: No rash.            ASSESSMENT/PLAN:  Agustin was seen today for adhd.    Diagnoses and all orders for this visit:    Needs flu shot  -     Influenza - Quadrivalent *Preferred* (6 months+) (PF)    Attention deficit hyperactivity disorder (ADHD), unspecified ADHD type  -     dexmethylphenidate (FOCALIN XR) 30 mg 24 hr capsule; Take 30 mg by mouth every morning.  -     dexmethylphenidate (FOCALIN XR) 30 mg 24 hr capsule; Take 30 mg by mouth every morning.  -     dexmethylphenidate (FOCALIN XR) 30 mg 24 hr capsule; Take 30 mg by mouth every morning.  -      dexmethylphenidate (FOCALIN) 10 MG tablet; Take two  tablets daily in the afternoon or evening as needed for homework or studying.    Anxiety  -     FLUoxetine 40 MG capsule; Take 1 capsule (40 mg total) by mouth once daily.         Age appropriate physical activity and nutritional counseling were completed during today's visit.     Follow Up:  3 months for medication recheck.

## 2024-03-02 ENCOUNTER — OFFICE VISIT (OUTPATIENT)
Dept: URGENT CARE | Facility: CLINIC | Age: 24
End: 2024-03-02
Payer: COMMERCIAL

## 2024-03-02 VITALS
SYSTOLIC BLOOD PRESSURE: 158 MMHG | HEART RATE: 65 BPM | DIASTOLIC BLOOD PRESSURE: 71 MMHG | TEMPERATURE: 97 F | OXYGEN SATURATION: 97 % | RESPIRATION RATE: 17 BRPM

## 2024-03-02 DIAGNOSIS — M94.0 ACUTE COSTOCHONDRITIS: Primary | ICD-10-CM

## 2024-03-02 DIAGNOSIS — R07.9 CHEST PAIN, UNSPECIFIED TYPE: ICD-10-CM

## 2024-03-02 PROCEDURE — 96372 THER/PROPH/DIAG INJ SC/IM: CPT | Mod: S$GLB,,, | Performed by: PHYSICIAN ASSISTANT

## 2024-03-02 PROCEDURE — 93005 ELECTROCARDIOGRAM TRACING: CPT | Mod: S$GLB,,, | Performed by: PHYSICIAN ASSISTANT

## 2024-03-02 PROCEDURE — 99214 OFFICE O/P EST MOD 30 MIN: CPT | Mod: 25,S$GLB,, | Performed by: PHYSICIAN ASSISTANT

## 2024-03-02 PROCEDURE — 93010 ELECTROCARDIOGRAM REPORT: CPT | Mod: S$GLB,,, | Performed by: INTERNAL MEDICINE

## 2024-03-02 RX ORDER — TIZANIDINE 2 MG/1
2 TABLET ORAL EVERY 8 HOURS PRN
Qty: 12 TABLET | Refills: 0 | Status: SHIPPED | OUTPATIENT
Start: 2024-03-02 | End: 2024-03-12

## 2024-03-02 RX ORDER — KETOROLAC TROMETHAMINE 30 MG/ML
30 INJECTION, SOLUTION INTRAMUSCULAR; INTRAVENOUS
Status: COMPLETED | OUTPATIENT
Start: 2024-03-02 | End: 2024-03-02

## 2024-03-02 RX ADMIN — KETOROLAC TROMETHAMINE 30 MG: 30 INJECTION, SOLUTION INTRAMUSCULAR; INTRAVENOUS at 12:03

## 2024-03-02 NOTE — LETTER
March 2, 2024      [facility]  [address] [place] [address] [address]  [facility] [address]  Phone: [phone]       Patient: Agustin Minor   YOB: 2000  Date of Visit: 03/02/2024    To Whom It May Concern:    Aissatou Minor  was at [facility] on 03/02/2024. The patient may return to work/school on 03/03/24 with no restrictions. If you have any questions or concerns, or if I can be of further assistance, please do not hesitate to contact me.    Sincerely,    Alma Singletary PA-C

## 2024-03-02 NOTE — PROGRESS NOTES
"Subjective:      Patient ID: Agustin Minor is a 23 y.o. male.    Vitals:  tympanic temperature is 97.2 °F (36.2 °C). His blood pressure is 158/71 (abnormal) and his pulse is 65. His respiration is 17 and oxygen saturation is 97%.     Chief Complaint: Chest Pain    Pt presented here today with mid chest pain for 4 days. Pt states pain has been constant. Pain is worse when he takes a deep breath and when he exerts himself (movement). Pain does not change with position or eating. Pain does not radiate. Stays around sternum. Pt is active and works out regularly. Denies any injury or change in activity. Has some shortness of breath which he attributes to having pain with deep breathing which causes him to not want to take a deep breath. Pt reports hx of "heart spasms" in the past. Per chart review, saw Cardiologist in 2021 for palpitations but work up had no acute findings. Family hx of heart attacks- father (age 46), maternal uncle and paternal uncle. Pt does have hx of asthma, anxiety, and daily stimulant use.  Denies palpitations, cough, leg swelling, focal weakness, numbness/tingling, dizziness, syncope, headache, fever, n/v, change in appetite, hx of GERD. No recent illnesses. Socials smokes with vape. Denies illicit drug use. Pt states he first tried to take antacids with no relief.     Chest Pain   This is a new problem. The current episode started in the past 7 days. The onset quality is gradual. The problem occurs constantly. The problem has been gradually worsening. The pain is at a severity of 5/10. The pain is moderate. The quality of the pain is described as squeezing and stabbing. Associated symptoms include shortness of breath. Pertinent negatives include no abdominal pain, back pain, claudication, cough, diaphoresis, dizziness, exertional chest pressure, fever, headaches, hemoptysis, irregular heartbeat, leg pain, lower extremity edema, malaise/fatigue, nausea, near-syncope, numbness, orthopnea, " palpitations, PND, sputum production, syncope, vomiting or weakness. The pain is aggravated by breathing and movement. He has tried antacids and rest for the symptoms. The treatment provided no relief. Risk factors include alcohol intake and male gender.   His past medical history is significant for anxiety/panic attacks.   Pertinent negatives for past medical history include no aneurysm, no cancer, no congenital heart disease, no connective tissue disease, no COPD, no CHF, no diabetes, no DVT, no hyperhomocysteinemia, no hyperlipidemia, no hypertension, no Kawasaki disease, no Marfan's syndrome, no MI, no mitral valve prolapse, no pacemaker, no PE, no PVD, no recent injury, no rheumatic fever, no seizures, no sickle cell disease, no sleep apnea, no spontaneous pneumothorax, no stimulant use, no strokes, no thyroid problem, no TIA, Redmond syndrome and no valve disorder. Prior diagnostic workup includes chest x-ray and exercise treadmill test.       Constitution: Negative for chills, sweating and fever.   HENT: Negative.     Neck: neck negative.   Cardiovascular:  Positive for chest pain. Negative for chest trauma, leg swelling, palpitations and passing out.   Eyes: Negative.    Respiratory:  Positive for shortness of breath and asthma. Negative for cough, sputum production, bloody sputum, COPD and wheezing.    Gastrointestinal:  Negative for abdominal pain, nausea, vomiting and heartburn.   Musculoskeletal:  Negative for trauma and back pain.   Skin: Negative.    Allergic/Immunologic: Positive for asthma.   Neurological:  Negative for dizziness, light-headedness, passing out, headaches, numbness, tingling and seizures.      Objective:     Physical Exam   Constitutional: He is oriented to person, place, and time. He appears well-developed.  Non-toxic appearance. He does not appear ill. No distress.   HENT:   Head: Normocephalic and atraumatic.   Ears:   Right Ear: External ear normal.   Left Ear: External ear normal.    Nose: Nose normal.   Eyes: Conjunctivae and EOM are normal. Pupils are equal, round, and reactive to light. Extraocular movement intact   Neck: Neck supple.   Cardiovascular: Normal rate, regular rhythm and normal heart sounds.   Pulmonary/Chest: Effort normal and breath sounds normal. He exhibits tenderness. He exhibits no deformity, no swelling and no retraction.       Abdominal: Normal appearance and bowel sounds are normal. He exhibits no distension. Soft. flat abdomen There is abdominal tenderness (mild) in the epigastric area. There is no guarding and negative Edwards's sign.   Musculoskeletal: Normal range of motion.         General: Normal range of motion.      Right lower leg: No edema.      Left lower leg: No edema.   Neurological: no focal deficit. He is alert and oriented to person, place, and time. He displays no weakness. No cranial nerve deficit or sensory deficit. Gait normal.   Skin: Skin is warm, dry, not diaphoretic, not pale and no rash.   Psychiatric: His behavior is normal. Mood normal.     The EKG shows a normal, regular Sinus Rhythm, at a rate of 70, there are not ST Changes. There is not a previous EKG for comparison. This EKG was interpreted by me.      Assessment:     1. Acute costochondritis    2. Chest pain, unspecified type        Plan:     EKG NSR. Lung sound clear. Pt risk factors: male gender, alcohol intake, stimulant use, and family history. He does have moderate ttp adjacent to sternum bilaterally. Costochondritis more likely based on clinical presentation. Pt given Toradol injection in clinic.  Advised that he may use oral NSAIDs as needed for pain beginning at least 12 hours after Toradol injection.  Discussed that muscle relaxer may also help with discomfort but to use with caution as this may cause drowsiness.  He was advised to not take medication with any alcohol or other sedating medications.  Avoid heavy lifting or activities that exacerbate pain.  Recommend close  follow-up with PCP or ED sooner if any new or worsening symptoms.  Acute costochondritis  -     ketorolac injection 30 mg  -     tiZANidine (ZANAFLEX) 2 MG tablet; Take 1 tablet (2 mg total) by mouth every 8 (eight) hours as needed (muscle spasms).  Dispense: 12 tablet; Refill: 0    Chest pain, unspecified type  -     EKG 12-lead  -     ketorolac injection 30 mg             Additional MDM:   Differential Diagnosis:   Symptom: Chest pain. <> The follow diagnoses were considered and will be evaluated: Acute MI, Angina Pectoris, Atypical Chest Pain, Chest Wall Pain, Costochondritis, GERD, Hiatal Hernia, Pericarditis and Pneumonia.        Heart Failure Score:   COPD = No

## 2024-03-04 LAB
OHS QRS DURATION: 96 MS
OHS QTC CALCULATION: 444 MS

## 2024-03-05 ENCOUNTER — OFFICE VISIT (OUTPATIENT)
Dept: PEDIATRICS | Facility: CLINIC | Age: 24
End: 2024-03-05
Payer: COMMERCIAL

## 2024-03-05 ENCOUNTER — TELEPHONE (OUTPATIENT)
Dept: PEDIATRICS | Facility: CLINIC | Age: 24
End: 2024-03-05
Payer: COMMERCIAL

## 2024-03-05 VITALS
SYSTOLIC BLOOD PRESSURE: 159 MMHG | BODY MASS INDEX: 31.55 KG/M2 | HEART RATE: 70 BPM | HEIGHT: 67 IN | WEIGHT: 201 LBS | TEMPERATURE: 97 F | DIASTOLIC BLOOD PRESSURE: 80 MMHG

## 2024-03-05 DIAGNOSIS — M94.0 COSTOCHONDRITIS: Primary | ICD-10-CM

## 2024-03-05 PROCEDURE — 3008F BODY MASS INDEX DOCD: CPT | Mod: CPTII,S$GLB,, | Performed by: PEDIATRICS

## 2024-03-05 PROCEDURE — 3077F SYST BP >= 140 MM HG: CPT | Mod: CPTII,S$GLB,, | Performed by: PEDIATRICS

## 2024-03-05 PROCEDURE — 3079F DIAST BP 80-89 MM HG: CPT | Mod: CPTII,S$GLB,, | Performed by: PEDIATRICS

## 2024-03-05 PROCEDURE — 99214 OFFICE O/P EST MOD 30 MIN: CPT | Mod: S$GLB,,, | Performed by: PEDIATRICS

## 2024-03-05 PROCEDURE — 1159F MED LIST DOCD IN RCRD: CPT | Mod: CPTII,S$GLB,, | Performed by: PEDIATRICS

## 2024-03-05 PROCEDURE — 99999 PR PBB SHADOW E&M-EST. PATIENT-LVL V: CPT | Mod: PBBFAC,,, | Performed by: PEDIATRICS

## 2024-03-05 RX ORDER — NAPROXEN 500 MG/1
500 TABLET ORAL 2 TIMES DAILY WITH MEALS
Qty: 60 TABLET | Refills: 1 | Status: CANCELLED | OUTPATIENT
Start: 2024-03-05

## 2024-03-05 NOTE — TELEPHONE ENCOUNTER
[place]ed [place], spoke with Charla, she will get that faxed over to us.

## 2024-03-05 NOTE — PATIENT INSTRUCTIONS
Dr. Lewis, [facility]  [facility]  [phone]        COSTOCHONDRITIS    What is Costochondritis?:  - Inflammation of the cartilage that attaches your ribs to your sternum (breastbone) , most commonly 2nd and/or 3rd rib  - Pain elicited by movement, i. e. lying down, bending, coughing, sneezing  - Most common in young adults and teenagers  - Many times, cause not known  - May mistake this pain for heart attack    Signs and symptoms:  - Pain in the chest wall, usually sharp in nature, but may be aching, dull, gripping  - Intermittent pain- worsens with movement  - Chest may feel tight  - Affected area may be sensitive to touch  - May follow unusual physical activity, lifting, trauma or URI    How long does it last?  - Usually disappears over a week or two  - Resume activity as pain lessens and resolves    Treatment:  1. At the beginning, rest, avoiding activities that make the pain worse  2.  Ice after use, heating pad at night  3.  Avoid sudden movements that may stress the inflammation  4.  Myoflex cream  5. .  Pain relief with oral medication    Naprosyn    Acetaminophen (Tylenol)     Call Immediately if:  - Your child starts acting very sick  - Chest pain becomes crushing and does not resolve    Call during regular office hours if:  - Pain lasts longer than a few weeks or worsens in spite of pain medicine  - Swelling develops around the ribs  - Fever (>100.4) with no other source develops  - You have any concerns or questions

## 2024-03-05 NOTE — PROGRESS NOTES
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    HPI  Patient presents to the clinic for a follow up on hospital visit for chest pain x 3 days ago. Pt's EKG results were normal from ER visit. Pt still feeling chest pain. Pt was diagnosed with costochondritis at emergency room. Pt had high calcium levels and needs parathyroid levels checked. Pt has a family history of high blood pressure and heart attacks.    3/2/23- Urgent care 12 lead EKG     3/3/24- [facility]  - Calcium was slightly high- after record review was 10.4 (nonsignificantly elevated)  - Costochondritis  Treatment:   Rest   Tinazidine- muscle relaxant   Naprosyn        Agustin's allergies, medications, history, and problem list were updated as appropriate.    Review of Systems  A comprehensive review of symptoms was completed and negative except as noted in the HPI.    OBJECTIVE:  Vital signs  Vitals:    03/05/24 1117   BP: (!) 159/80   BP Location: Left arm   Patient Position: Sitting   BP Method: Large (Automatic)   Pulse: 70   Temp: 97.4 °F (36.3 °C)   TempSrc: Oral   Weight: 91.2 kg (201 lb)   Height: 5' 7" (1.702 m)        Physical Exam  Vitals reviewed.   Constitutional:       Appearance: Normal appearance.   HENT:      Right Ear: Tympanic membrane normal.      Left Ear: Tympanic membrane normal.      Nose: Nose normal.      Mouth/Throat:      Pharynx: Oropharynx is clear.   Eyes:      Conjunctiva/sclera: Conjunctivae normal.   Cardiovascular:      Rate and Rhythm: Normal rate and regular rhythm.      Heart sounds: Normal heart sounds. No murmur heard.     No friction rub. No gallop.   Pulmonary:      Breath sounds: Normal breath sounds.   Abdominal:      Palpations: Abdomen is soft.      Tenderness: There is no abdominal tenderness.   Musculoskeletal:         General: Normal range of motion.      Cervical back: Neck supple.      Comments: Left border of sternal border- tender   Skin:     " Findings: No rash.   Neurological:      General: No focal deficit present.           ASSESSMENT/PLAN:  Agustin was seen today for chest pain and hospital follow up.    Diagnoses and all orders for this visit:    Costochondritis    Other orders  The following orders have not been finalized:  -     Cancel: naproxen (NAPROSYN) 500 MG tablet         Recent Results (from the past 672 hour(s))   EKG 12-lead    Collection Time: 03/02/24 11:48 AM   Result Value Ref Range    QRS Duration 96 ms    [facility] QTC Calculation 444 ms       Age appropriate physical activity and nutritional counseling were completed during today's visit.     Follow Up:  No follow-ups on file.    Record release for [facility]   For lab results- Calcium, other labs    Use Naprosyn    Returning next Tuesday    Check labs from ER/Hospital visit two days ago- sent off record release

## 2024-03-11 PROBLEM — M94.0 COSTOCHONDRITIS: Status: ACTIVE | Noted: 2024-03-11
